# Patient Record
Sex: FEMALE | Race: WHITE
[De-identification: names, ages, dates, MRNs, and addresses within clinical notes are randomized per-mention and may not be internally consistent; named-entity substitution may affect disease eponyms.]

---

## 2017-07-26 ENCOUNTER — HOSPITAL ENCOUNTER (OUTPATIENT)
Dept: HOSPITAL 45 - C.RAD1850 | Age: 32
Discharge: HOME | End: 2017-07-26
Payer: COMMERCIAL

## 2017-07-26 DIAGNOSIS — E11.9: Primary | ICD-10-CM

## 2017-07-26 DIAGNOSIS — F41.9: ICD-10-CM

## 2017-07-26 DIAGNOSIS — E03.9: ICD-10-CM

## 2017-07-26 DIAGNOSIS — F32.9: ICD-10-CM

## 2017-07-26 DIAGNOSIS — G47.00: ICD-10-CM

## 2017-07-26 LAB
ALBUMIN/GLOB SERPL: 1.2 {RATIO} (ref 0.9–2)
ALP SERPL-CCNC: 71 U/L (ref 45–117)
ALT SERPL-CCNC: 21 U/L (ref 12–78)
ANION GAP SERPL CALC-SCNC: 14 MMOL/L (ref 3–11)
AST SERPL-CCNC: 8 U/L (ref 15–37)
B-OH-BUTYR SERPL-SCNC: 66.7 MG/DL (ref 0.2–2.81)
BASOPHILS # BLD: 0.03 K/UL (ref 0–0.2)
BASOPHILS NFR BLD: 0.6 %
BUN SERPL-MCNC: 20 MG/DL (ref 7–18)
BUN/CREAT SERPL: 21.5 (ref 10–20)
CALCIUM SERPL-MCNC: 9.5 MG/DL (ref 8.5–10.1)
CHLORIDE SERPL-SCNC: 104 MMOL/L (ref 98–107)
CHOLEST/HDLC SERPL: 2.4 {RATIO}
CO2 SERPL-SCNC: 16 MMOL/L (ref 21–32)
COMPLETE: YES
CREAT SERPL-MCNC: 0.92 MG/DL (ref 0.6–1.2)
EOSINOPHIL NFR BLD AUTO: 261 K/UL (ref 130–400)
EST. AVERAGE GLUCOSE BLD GHB EST-MCNC: 275 MG/DL
GLOBULIN SER-MCNC: 3.7 GM/DL (ref 2.5–4)
GLUCOSE SERPL-MCNC: 336 MG/DL (ref 70–99)
GLUCOSE UR QL: 79 MG/DL
HCT VFR BLD CALC: 49.7 % (ref 37–47)
IG%: 0.4 %
IMM GRANULOCYTES NFR BLD AUTO: 34.5 %
KETONES UR QL STRIP: 95 MG/DL
LYMPHOCYTES # BLD: 1.71 K/UL (ref 1.2–3.4)
MCH RBC QN AUTO: 33.9 PG (ref 25–34)
MCHC RBC AUTO-ENTMCNC: 35 G/DL (ref 32–36)
MCV RBC AUTO: 96.9 FL (ref 80–100)
MONOCYTES NFR BLD: 6.9 %
NEUTROPHILS # BLD AUTO: 2.4 %
NEUTROPHILS NFR BLD AUTO: 55.2 %
NITRITE UR QL STRIP: 93 MG/DL (ref 0–150)
PH UR: 193 MG/DL (ref 0–200)
PMV BLD AUTO: 12.5 FL (ref 7.4–10.4)
POTASSIUM SERPL-SCNC: 5.1 MMOL/L (ref 3.5–5.1)
RBC # BLD AUTO: 5.13 M/UL (ref 4.2–5.4)
SODIUM SERPL-SCNC: 134 MMOL/L (ref 136–145)
TSH SERPL-ACNC: 0.8 UIU/ML (ref 0.3–4.5)
VERY LOW DENSITY LIPOPROT CALC: 19 MG/DL
WBC # BLD AUTO: 4.96 K/UL (ref 4.8–10.8)

## 2017-07-26 NOTE — DIAGNOSTIC IMAGING REPORT
CHEST 2 VIEWS ROUTINE



CLINICAL HISTORY: COARSE/CRACKLES L BASE dyspnea



COMPARISON STUDY:  10/30/2013



FINDINGS: The bones soft tissues and hemidiaphragms are normal. The

cardiomediastinal silhouette is normal. The lungs are clear. The pulmonary

vasculature is normal. 



IMPRESSION:  Negative chest. 











The above report was generated using voice recognition software.  It may contain

grammatical, syntax or spelling errors.









Electronically signed by:  Kaz Cummins M.D.

7/26/2017 12:13 PM



Dictated Date/Time:  7/26/2017 12:13 PM

## 2017-07-31 ENCOUNTER — HOSPITAL ENCOUNTER (OUTPATIENT)
Dept: HOSPITAL 45 - C.LAB1850 | Age: 32
Discharge: HOME | End: 2017-07-31
Payer: COMMERCIAL

## 2017-07-31 DIAGNOSIS — E10.10: ICD-10-CM

## 2017-07-31 DIAGNOSIS — E10.65: Primary | ICD-10-CM

## 2017-07-31 LAB
ALBUMIN/GLOB SERPL: 1.2 {RATIO} (ref 0.9–2)
ALP SERPL-CCNC: 56 U/L (ref 45–117)
ALT SERPL-CCNC: 19 U/L (ref 12–78)
ANION GAP SERPL CALC-SCNC: 5 MMOL/L (ref 3–11)
APPEARANCE UR: CLEAR
AST SERPL-CCNC: 11 U/L (ref 15–37)
B-OH-BUTYR SERPL-SCNC: 1.33 MG/DL (ref 0.2–2.81)
BILIRUB UR-MCNC: (no result) MG/DL
BUN SERPL-MCNC: 13 MG/DL (ref 7–18)
BUN/CREAT SERPL: 20.2 (ref 10–20)
CALCIUM SERPL-MCNC: 8.6 MG/DL (ref 8.5–10.1)
CHLORIDE SERPL-SCNC: 109 MMOL/L (ref 98–107)
CO2 SERPL-SCNC: 26 MMOL/L (ref 21–32)
COLOR UR: YELLOW
CREAT SERPL-MCNC: 0.63 MG/DL (ref 0.6–1.2)
GLOBULIN SER-MCNC: 2.9 GM/DL (ref 2.5–4)
GLUCOSE SERPL-MCNC: 175 MG/DL (ref 70–99)
MANUAL MICROSCOPIC REQUIRED?: NO
NITRITE UR QL STRIP: (no result)
PH UR STRIP: 6 [PH] (ref 4.5–7.5)
POTASSIUM SERPL-SCNC: 4.1 MMOL/L (ref 3.5–5.1)
REVIEW REQ?: NO
SODIUM SERPL-SCNC: 140 MMOL/L (ref 136–145)
SP GR UR STRIP: 1.01 (ref 1–1.03)
URINE BILL WITH OR WITHOUT MIC: (no result)
UROBILINOGEN UR-MCNC: (no result) MG/DL

## 2017-08-02 NOTE — CODING QUERY NO DIAGNOSIS
TREATMENT RENDERED WITHOUT A DIAGNOSIS                                                  



To promote full compliance with coding requirements relating to patient care, physician 
participation is requested in all cases of  uncertainty.  Please assist us with 
providing a diagnosis/symptom for the test(s) below:



A diagnosis/symptom was not documented on your Order.  A valid diagnosis/symptom is 
required to bill all insurances.



**Please remember that we are unable to code a diagnosis of rule out, probable, possible, 
questionable, or suspected.  



Tests that require a diagnosis:

DOS 7/31

* CMP      DIAGNOSIS:

* Urinalysis      DIAGNOSIS:

* Beta-OH-Butyr      DIAGNOSIS:





Provider Signature: _____________________________ Date: _________



Thank you  

Raquel Cesar

Health Information Management

Phone:  [phone]

Fax:  [phone]



Once completed, please kindly fax back to [phone]



For questions please call [phone]

## 2017-09-29 ENCOUNTER — HOSPITAL ENCOUNTER (OUTPATIENT)
Dept: HOSPITAL 45 - C.LAB1850 | Age: 32
Discharge: HOME | End: 2017-09-29
Attending: NURSE PRACTITIONER
Payer: COMMERCIAL

## 2017-09-29 DIAGNOSIS — E03.9: ICD-10-CM

## 2017-09-29 DIAGNOSIS — E11.9: Primary | ICD-10-CM

## 2017-09-29 LAB
ALBUMIN/GLOB SERPL: 1.2 {RATIO} (ref 0.9–2)
ALP SERPL-CCNC: 56 U/L (ref 45–117)
ALT SERPL-CCNC: 16 U/L (ref 12–78)
ANION GAP SERPL CALC-SCNC: 10 MMOL/L (ref 3–11)
AST SERPL-CCNC: 13 U/L (ref 15–37)
BUN SERPL-MCNC: 12 MG/DL (ref 7–18)
BUN/CREAT SERPL: 16.8 (ref 10–20)
CALCIUM SERPL-MCNC: 9.1 MG/DL (ref 8.5–10.1)
CHLORIDE SERPL-SCNC: 108 MMOL/L (ref 98–107)
CHOLEST/HDLC SERPL: 2.4 {RATIO}
CO2 SERPL-SCNC: 23 MMOL/L (ref 21–32)
CREAT SERPL-MCNC: 0.71 MG/DL (ref 0.6–1.2)
GLOBULIN SER-MCNC: 3.3 GM/DL (ref 2.5–4)
GLUCOSE SERPL-MCNC: 111 MG/DL (ref 70–99)
GLUCOSE UR QL: 73 MG/DL
KETONES UR QL STRIP: 83 MG/DL
NITRITE UR QL STRIP: 93 MG/DL (ref 0–150)
PH UR: 175 MG/DL (ref 0–200)
POTASSIUM SERPL-SCNC: 4 MMOL/L (ref 3.5–5.1)
SODIUM SERPL-SCNC: 141 MMOL/L (ref 136–145)
TSH SERPL-ACNC: 0.04 UIU/ML (ref 0.3–4.5)
VERY LOW DENSITY LIPOPROT CALC: 19 MG/DL

## 2017-09-30 LAB — EST. AVERAGE GLUCOSE BLD GHB EST-MCNC: 160 MG/DL

## 2018-01-31 ENCOUNTER — HOSPITAL ENCOUNTER (INPATIENT)
Dept: HOSPITAL 45 - C.EDB | Age: 33
LOS: 5 days | Discharge: HOME | DRG: 885 | End: 2018-02-05
Attending: PSYCHIATRY & NEUROLOGY | Admitting: PSYCHIATRY & NEUROLOGY
Payer: COMMERCIAL

## 2018-01-31 VITALS
BODY MASS INDEX: 17.8 KG/M2 | BODY MASS INDEX: 17.8 KG/M2 | HEIGHT: 64.02 IN | WEIGHT: 104.28 LBS | HEIGHT: 64.02 IN | WEIGHT: 104.28 LBS | BODY MASS INDEX: 17.8 KG/M2

## 2018-01-31 VITALS — OXYGEN SATURATION: 99 %

## 2018-01-31 VITALS — SYSTOLIC BLOOD PRESSURE: 110 MMHG | HEART RATE: 65 BPM | DIASTOLIC BLOOD PRESSURE: 75 MMHG | TEMPERATURE: 97.52 F

## 2018-01-31 DIAGNOSIS — F41.9: ICD-10-CM

## 2018-01-31 DIAGNOSIS — Z87.891: ICD-10-CM

## 2018-01-31 DIAGNOSIS — Z79.899: ICD-10-CM

## 2018-01-31 DIAGNOSIS — Z79.4: ICD-10-CM

## 2018-01-31 DIAGNOSIS — F33.2: Primary | ICD-10-CM

## 2018-01-31 DIAGNOSIS — R45.851: ICD-10-CM

## 2018-01-31 DIAGNOSIS — E10.9: ICD-10-CM

## 2018-01-31 DIAGNOSIS — E03.9: ICD-10-CM

## 2018-01-31 LAB
ALBUMIN SERPL-MCNC: 3.8 GM/DL (ref 3.4–5)
ALP SERPL-CCNC: 91 U/L (ref 45–117)
ALT SERPL-CCNC: 24 U/L (ref 12–78)
AST SERPL-CCNC: 11 U/L (ref 15–37)
BASOPHILS # BLD: 0.01 K/UL (ref 0–0.2)
BASOPHILS NFR BLD: 0.2 %
BUN SERPL-MCNC: 13 MG/DL (ref 7–18)
CALCIUM SERPL-MCNC: 8.7 MG/DL (ref 8.5–10.1)
CO2 SERPL-SCNC: 30 MMOL/L (ref 21–32)
CREAT SERPL-MCNC: 1.03 MG/DL (ref 0.6–1.2)
EOS ABS #: 0.17 K/UL (ref 0–0.5)
EOSINOPHIL NFR BLD AUTO: 208 K/UL (ref 130–400)
GLUCOSE SERPL-MCNC: 378 MG/DL (ref 70–99)
HCT VFR BLD CALC: 46.4 % (ref 37–47)
HGB BLD-MCNC: 16.6 G/DL (ref 12–16)
IG#: 0.02 K/UL (ref 0–0.02)
IMM GRANULOCYTES NFR BLD AUTO: 33.3 %
LYMPHOCYTES # BLD: 1.55 K/UL (ref 1.2–3.4)
MCH RBC QN AUTO: 35.5 PG (ref 25–34)
MCHC RBC AUTO-ENTMCNC: 35.8 G/DL (ref 32–36)
MCV RBC AUTO: 99.1 FL (ref 80–100)
MONO ABS #: 0.4 K/UL (ref 0.11–0.59)
MONOCYTES NFR BLD: 8.6 %
NEUT ABS #: 2.5 K/UL (ref 1.4–6.5)
NEUTROPHILS # BLD AUTO: 3.7 %
NEUTROPHILS NFR BLD AUTO: 53.8 %
PMV BLD AUTO: 11.6 FL (ref 7.4–10.4)
POTASSIUM SERPL-SCNC: 3.9 MMOL/L (ref 3.5–5.1)
PROT SERPL-MCNC: 7.7 GM/DL (ref 6.4–8.2)
RED CELL DISTRIBUTION WIDTH CV: 12.7 % (ref 11.5–14.5)
RED CELL DISTRIBUTION WIDTH SD: 45.8 FL (ref 36.4–46.3)
SODIUM SERPL-SCNC: 132 MMOL/L (ref 136–145)
WBC # BLD AUTO: 4.65 K/UL (ref 4.8–10.8)

## 2018-01-31 RX ADMIN — INSULIN ASPART SCH UNITS: 100 INJECTION, SOLUTION INTRAVENOUS; SUBCUTANEOUS at 22:00

## 2018-01-31 NOTE — EMERGENCY ROOM VISIT NOTE
History


Report prepared by Elva:  Nick Dumont


Under the Supervision of:  Dr. Iggy Benjamin M.D.


First contact with patient:  13:50


Chief Complaint:  MENTAL HEALTH EVALUATION


Stated Complaint:  SUICIADAL IDEATION, PSYCHOMOTOR,NETARDATION, DEP





History of Present Illness


The patient is a 32 year old female who presents to the Emergency Room with 

complaints of suicidal ideation. She denies having a plan and states it is 

appealing but denies follow through due to her family. She states she was fired 

and that her grandma  recently which has triggered her worsening depression 

for the past 2 months. She is currently taking Zoloft that was prescribed by 

her endocrinologist. She states she overdosed on insulin 5 years ago in an 

attempt to end her life. She denies fevers, chills, cough, congestion, and 

nausea, and vomiting. She states limited dietary intake. Of note, she does not 

currently follow up with a psychiatrist.





   Source of History:  patient


   Onset:  prior to arrival


   Position:  other (suicidal ideation)


   Timing:  worsening (worsening depression for the past 2 months)


   Associated Symptoms:  No fevers, No chills, No cough


Note:


Patient complains of limited dietary intake. Patient denies congestion.





Review of Systems


See HPI for pertinent positives and negatives.  A total of ten systems were 

reviewed and were otherwise negative.





Past Medical & Surgical


Medical Problems:


(1) Anxiety disorder NOS


(2) DIAB JOHN WO COMPL, TYPE I [JUVENILE TYPE], NOT UNCNTRLD


(3) DIAB W KETOACIDOSIS, TYPE I [JUVENILE TYPE], UNCONTROLLED


(4) Hypothyroidism


(5) Major depressive disorder


(6) Substance abuse


(7) URIN TRACT INFECTION NOS








Family History





FH: cancer


Hypertension


Kidney disease or stones





Social History


Smoking Status:  Former Smoker (She states she quit 2 weeks ago)


Alcohol Use:  none


Drug Use:  none (marijuana)


Marital Status:  in relationship


Housing Status:  lives with significant other


Occupation Status:  unemployed





Current/Historical Medications


Scheduled


Insulin Human Regular (Novolin R), 10 UNITS SC BID


Levothyroxine Sodium (Levothyroxine Sodium), 100 MCG PO 6XWK


Sertraline (Zoloft), 100 MG PO DAILY





Allergies


Coded Allergies:  


     Sulfamethoxazole w/Trimethoprim (Unverified  Allergy, Unknown, swelling, )


     Insulin Lispro (Verified  Adverse Reaction, Intermediate, MUSCLE WASTING, )


     Phenol (Unverified  Adverse Reaction, Unknown, Muscle Wasting, 16)





Physical Exam


Vital Signs











  Date Time  Temp Pulse Resp B/P (MAP) Pulse Ox O2 Delivery O2 Flow Rate FiO2


 


18 13:37 36.4 95 16 141/93 99 Room Air  











Physical Exam


GENERAL: Awake, alert, well-appearing, in no distress. Depressed affect. 

Positive suicidal ideation.


HENT: Normocephalic, atraumatic. Dry mucous membranes.


EYES: Normal conjunctiva. Sclera non-icteric.


NECK: Supple. No nuchal rigidity. FROM. No JVD.


RESPIRATORY: Clear to auscultation.


CARDIAC: Regular rate, normal rhythm. Extremities warm and well perfused. 

Pulses equal.


ABDOMEN: Soft, non-distended. No tenderness to palpation. No rebound or 

guarding. No masses.


RECTAL: Deferred.


MUSCULOSKELETAL: Chest examination reveals no tenderness. The back is 

symmetrical on inspection without obvious abnormality. There is no CVA 

tenderness to palpation. No joint edema. 


LOWER EXTREMITIES: Calves are equal size bilaterally and non-tender. No edema. 

No discoloration. 


NEURO: Normal sensorium. No sensory or motor deficits noted. 


SKIN: No rash or jaundice noted.





Medical Decision & Procedures


Laboratory Results


18 14:23








Red Blood Count 4.68, Mean Corpuscular Volume 99.1, Mean Corpuscular Hemoglobin 

35.5, Mean Corpuscular Hemoglobin Concent 35.8, Mean Platelet Volume 11.6, 

Neutrophils (%) (Auto) 53.8, Lymphocytes (%) (Auto) 33.3, Monocytes (%) (Auto) 

8.6, Eosinophils (%) (Auto) 3.7, Basophils (%) (Auto) 0.2, Neutrophils # (Auto) 

2.50, Lymphocytes # (Auto) 1.55, Monocytes # (Auto) 0.40, Eosinophils # (Auto) 

0.17, Basophils # (Auto) 0.01





18 14:23

















Test


  18


14:23 18


14:35


 


White Blood Count


  4.65 K/uL


(4.8-10.8) 


 


 


Red Blood Count


  4.68 M/uL


(4.2-5.4) 


 


 


Hemoglobin


  16.6 g/dL


(12.0-16.0) 


 


 


Hematocrit 46.4 % (37-47)  


 


Mean Corpuscular Volume


  99.1 fL


() 


 


 


Mean Corpuscular Hemoglobin


  35.5 pg


(25-34) 


 


 


Mean Corpuscular Hemoglobin


Concent 35.8 g/dl


(32-36) 


 


 


Platelet Count


  208 K/uL


(130-400) 


 


 


Mean Platelet Volume


  11.6 fL


(7.4-10.4) 


 


 


Neutrophils (%) (Auto) 53.8 %  


 


Lymphocytes (%) (Auto) 33.3 %  


 


Monocytes (%) (Auto) 8.6 %  


 


Eosinophils (%) (Auto) 3.7 %  


 


Basophils (%) (Auto) 0.2 %  


 


Neutrophils # (Auto)


  2.50 K/uL


(1.4-6.5) 


 


 


Lymphocytes # (Auto)


  1.55 K/uL


(1.2-3.4) 


 


 


Monocytes # (Auto)


  0.40 K/uL


(0.11-0.59) 


 


 


Eosinophils # (Auto)


  0.17 K/uL


(0-0.5) 


 


 


Basophils # (Auto)


  0.01 K/uL


(0-0.2) 


 


 


RDW Standard Deviation


  45.8 fL


(36.4-46.3) 


 


 


RDW Coefficient of Variation


  12.7 %


(11.5-14.5) 


 


 


Immature Granulocyte % (Auto) 0.4 %  


 


Immature Granulocyte # (Auto)


  0.02 K/uL


(0.00-0.02) 


 


 


Anion Gap


  6.0 mmol/L


(3-11) 


 


 


Est Creatinine Clear Calc


Drug Dose 59.6 ml/min 


  


 


 


Estimated GFR (


American) 84.5 


  


 


 


Estimated GFR (Non-


American 72.9 


  


 


 


BUN/Creatinine Ratio 12.7 (10-20)  


 


Calcium Level


  8.7 mg/dl


(8.5-10.1) 


 


 


Total Bilirubin


  0.5 mg/dl


(0.2-1) 


 


 


Direct Bilirubin  mg/dl (0-0.2)  


 


Aspartate Amino Transf


(AST/SGOT) 11 U/L (15-37) 


  


 


 


Alanine Aminotransferase


(ALT/SGPT) 24 U/L (12-78) 


  


 


 


Alkaline Phosphatase


  91 U/L


() 


 


 


Total Protein


  7.7 gm/dl


(6.4-8.2) 


 


 


Albumin


  3.8 gm/dl


(3.4-5.0) 


 


 


Globulin


  3.9 gm/dl


(2.5-4.0) 


 


 


Albumin/Globulin Ratio 1.0 (0.9-2)  


 


Thyroid Stimulating Hormone


(TSH) 4.360 uIu/ml


(0.300-4.500) 


 


 


Chemistry Specimen Hemolysis   


 


Ethyl Alcohol mg/dL


  < 3.0 mg/dl


(0-3) 


 


 


Urine Color  YELLOW 


 


Urine Appearance  CLEAR (CLEAR) 


 


Urine pH  5.0 (4.5-7.5) 


 


Urine Specific Gravity


  


  > 1.045


(1.000-1.030)


 


Urine Protein  NEG (NEG) 


 


Urine Glucose (UA)  3+ (NEG) 


 


Urine Ketones  NEG (NEG) 


 


Urine Occult Blood  NEG (NEG) 


 


Urine Nitrite  NEG (NEG) 


 


Urine Bilirubin  NEG (NEG) 


 


Urine Urobilinogen  NEG (NEG) 


 


Urine Leukocyte Esterase  NEG (NEG) 


 


Urine Pregnancy Test  NEG (NEG) 


 


Urine Opiates Screen  NEG (NEG) 


 


Urine Methadone, Qualitative  NEG (NEG) 


 


Urine Barbiturates  NEG (NEG) 


 


Urine Phencyclidine (PCP)


Level 


  NEG (NEG) 


 


 


Ur


Amphetamine/Methamphetamine 


  NEG (NEG) 


 


 


MDMA (Ecstasy) Screen  NEG (NEG) 


 


Urine Benzodiazepines Screen  NEG (NEG) 


 


Urine Cocaine Metabolite  NEG (NEG) 


 


Urine Marijuana (THC)  NEG (NEG) 





Laboratory results reviewed by me





Medications Administered











 Medications


  (Trade)  Dose


 Ordered  Sig/Denisse


 Route  Start Time


 Stop Time Status Last Admin


Dose Admin


 


 Sodium Chloride  1,000 ml @ 


 999 mls/hr  Q1H1M STAT


 IV  18 14:20


 18 15:20 DC 18 14:44


999 MLS/HR











ED Course


1350: The patient was evaluated in room A7. A complete history and physical 

exam was performed.





1550: Upon reexamination, the patient was doing well. I discussed the test 

results and treatment plan with the . The patient will be evaluated 

for further management.





Medical Decision


I reviewed the patient's past medical history, medications, and the nursing 

notes as described above.





The patient's presentation and history were concerning for etiologies such as 

mood disorder, infection, hypoglycemia, electrolyte abnormalities, cardiac 

sources, intracerebral event, toxicologic, neurologic, as well as others were 

entertained.





The patient is a 32-year-old woman with a past medical history of type 1 

diabetes, depression and prior suicide attempt who presents to emergency 

department with worsening depression and suicidal ideation over the past couple 

months in the setting of losing her job, breaking up with her partner, and the 

death of her grandmother per hpi.  On arrival the patient has a depressed 

affect but is in no acute distress, afebrile with stable vital signs. Endorses 

SI but denies plan. Interested in getting help and inpatient psych admission.  

FSBG elevated on arrival however the patient's asymptomatic.  Subsequent BMP 

with no anionic gap thus no DKA.  Thus, the patient was medically cleared.  She 

was given IV fluid hydration with subsequent decrease in her glucose from 469 

to 267 with IV fluids alone.  Sliding-scale insulin initially ordered however 

patient was accepted to [address] [place] [address] with plan for pharmacy consultation for insulin 

regimen.  201 signed.





Medication Reconcilliation


Current Medication List:  was personally reviewed by me





Blood Pressure Screening


Patient's blood pressure:  Elevated blood pressure


Blood pressure disposition:  Elevated BP felt to be situational





Impression





 Primary Impression:  


 Depression


 Additional Impression:  


 Suicidal ideation





Scribe Attestation


The scribe's documentation has been prepared under my direction and personally 

reviewed by me in its entirety. I confirm that the note above accurately 

reflects all work, treatment, procedures, and medical decision making performed 

by me.





Departure Information


Referrals


Carol Nelson M.D. (PCP)





Patient Instructions


[facility]





Problem Qualifiers

## 2018-02-01 VITALS — TEMPERATURE: 97.7 F | SYSTOLIC BLOOD PRESSURE: 116 MMHG | DIASTOLIC BLOOD PRESSURE: 82 MMHG | HEART RATE: 71 BPM

## 2018-02-01 RX ADMIN — INSULIN ASPART SCH UNITS: 100 INJECTION, SOLUTION INTRAVENOUS; SUBCUTANEOUS at 04:00

## 2018-02-01 RX ADMIN — INSULIN ASPART SCH UNITS: 100 INJECTION, SOLUTION INTRAVENOUS; SUBCUTANEOUS at 21:41

## 2018-02-01 RX ADMIN — LEVOTHYROXINE SODIUM SCH MCG: 100 TABLET ORAL at 08:29

## 2018-02-01 RX ADMIN — INSULIN GLARGINE SCH UNITS: 100 INJECTION, SOLUTION SUBCUTANEOUS at 08:55

## 2018-02-01 RX ADMIN — INSULIN ASPART SCH UNITS: 100 INJECTION, SOLUTION INTRAVENOUS; SUBCUTANEOUS at 13:14

## 2018-02-01 RX ADMIN — INSULIN ASPART SCH UNITS: 100 INJECTION, SOLUTION INTRAVENOUS; SUBCUTANEOUS at 17:46

## 2018-02-01 RX ADMIN — INSULIN ASPART SCH UNITS: 100 INJECTION, SOLUTION INTRAVENOUS; SUBCUTANEOUS at 00:22

## 2018-02-01 RX ADMIN — INSULIN ASPART SCH UNITS: 100 INJECTION, SOLUTION INTRAVENOUS; SUBCUTANEOUS at 08:58

## 2018-02-01 NOTE — PSYCHIATRIC HISTORY & PHYSICAL
History


Date of Service


Feb 1, 2018.





Identifying Data





Yoanna Velazquez is a 32-year-old female admitted on Jan 31, 2018 at 17:01 who 

currently lives in [place] with 3 roommates.  Yoanna Velazquez was admitted on 

a 201 voluntary commitment.  Patient is admitted from home.  The patient was 

brought to the ED by the self transport.  Information provided by the patient 

is considered to be reliable.





Chief Complaint


"Where to start...I've just been overwhelmed and feeling like I'm drowning in 

depression".





History of Present Illness


Yoanna Velazquez is a 32-year-old  female admitted to [address] [place] [address] on a 

voluntary commitment following worsening depression with SI for the past 2 

months.  Pt notes several recent stressors including her grandmother's death in 

September 2017 and her boyfriend of 3.5 years leaving her in November 2017.  Pt 

states she started to feel worsening of depression in mid-December which 

worsened when she was fired from her job 1.5 weeks ago due to not showing up 

for work due to decreased mood.  Pt states she has struggled with depression "

on and off for my entire adult life".  She has had several inpatient 

hospitalizations in the past for depression and SI at [facility], [facility], 

[place], and [facility].  Her most recent admission was at [place] [facility] in 

2014 following depression and a suicide attempt in which she attempted to 

overdose on her insulin.  She was been taking Zoloft 100mg regularly for the 

past 9 months, currently prescribed by her endocrinologist as her mood was 

concerning.  Pt is a Type-1 diabetic and also has been diagnosed with 

hypothyroidism.  She reports recent weight loss due to nausea and abdominal 

pain with ingestion of food.  





Pt reports depressive symptoms including low mood, isolative behavior, 

decreased functioning in regard to attending to hygiene, reduced appetite 

resulting in >20lb weight loss in the last 2 months, sleep difficulties with 

frequent awakening, increased desire to remain in bed, decreased concentration, 

hopelessness, and guilt.  Pt states she has had SI for the past few months, and 

states "suicide is appealing, but I haven't thought about an actual plan".  Pt 

states her concern for her family has prevented her from acting on these 

thoughts.  Pt also reports ongoing anxiety specifically in relation to social 

interactions.  She reports feeling uncomfortable with situations outside of her 

home or group of roommates.  She reports 2-3 panic attacks in the last months 

in which "it feels like death is imminent and I worry about what that will mean 

for me and my family".  





Pt denies current outpatient psychiatric treatment, but was seen a few years 

ago at [facility] for both medication management and therapy.  Pt 

reports trials of Celexa, Wellbutrin, Klonopin, Abilify, and Lexapro.  Pt feels 

the Lexapro has worked the best and recalls taking 10mg daily for about 1 year.

  At some point Abilify was added to the regimen which she reports was also 

beneficial to her mood.  Pt denies HI, SIB aside from unintentionally picking 

skin, joel, paranoia, OCD, PTSD, eating disorder, and other psychosis.





Past Psychiatric History


Current OP Treatment:  no current treatment


Prior OP Treatment:  psychiatrist ([facility]), therapist ([facility])


Prior Psych Hospitalizations:  [facility], other ([place], 

[facility], [facility], and [place])


Access to a Gun:  No


Suicide Attempts:  Yes (2014 - attempted insulin overdose)


Past Medication Trials


Celexa - increased SI


Lexapro - worked well; x1 year


Abilify - adjunct to Lexapro; worked well


Wellbutrin


Klonopin





Past Medical/Surgical History





(1) DIAB JOHN WO COMPL, TYPE I [JUVENILE TYPE], NOT UNCNTRLD





Allergies


Allergies:  


Coded Allergies:  


     Sulfamethoxazole w/Trimethoprim (Unverified  Allergy, Unknown, swelling, 9/ 17/16)


     Insulin Lispro (Verified  Adverse Reaction, Intermediate, MUSCLE WASTING, 9 /17/16)


     Phenol (Unverified  Adverse Reaction, Unknown, Muscle Wasting, 9/17/16)





Home Medications


Scheduled


Insulin Human Regular (Novolin R), 10 UNITS SC BID


Levothyroxine Sodium (Levothyroxine Sodium), 100 MCG PO 6XWK


Sertraline (Zoloft), 100 MG PO DAILY





Family History





FH: cancer


Hypertension


Kidney disease or stones


History of Suicide:  Yes (grandmother's brother)


History of Substance Abuse:  Yes (brother)


Psychiatric History:  Yes (mother and brother with anxiety, father with 

depression)





Alcohol Use


Alcohol Use In Past 12 Months:  No


AUDIT Total Score:  0





Smoking Use


Smoking Status:  Former Smoker





Substance History


Pt reports marijuana use 2-3 times a month





Personal History


Lives in:  [place] [place]


Childhood:


Grew up in [place] 1 boilogical brother. Her mother and 2 younger half 

sisters live locally.


Education:  graduated college (BS:  [employer])


Work History:


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


Relationship History:  never 


Children:  none


Spiritual Affiliation:  "spiritual"


Legal History:  none


Psychological Trauma History:  Significant Loss (grandmother, father's death 

from cancer)





Review of Systems


Psych:  denies symptoms other than stated above


Constitutional: denied


Cardiovascular: reports tachycardia with anxiety


Respiratory:  cough, believed due to smoking history


GI: reports nausea, indigestion, and abdominal pain with food intake


Neurologic: denied


Remainder of 10 body systems also reviewed and denied other than noted above.





Examination


Physical Examination


A physical exam was performed in the ER prior to admission to the unit by Dr. Benjamin.  I accept that physical as correct/medical clearance for the inpatient 

physical exam.





Vital Signs





Vital Signs Past 12 Hours








  Date Time  Temp Pulse Resp B/P (MAP) Pulse Ox O2 Delivery O2 Flow Rate FiO2


 


2/1/18 06:33 36.5 71 16 101/65    





  80  116/82    











Laboratory Results





Last 24 Hours








Test


  1/31/18


14:10 1/31/18


14:12 1/31/18


14:23 1/31/18


14:35


 


Bedside Glucose 469 mg/dl  485 mg/dl   


 


White Blood Count   4.65 K/uL  


 


Red Blood Count   4.68 M/uL  


 


Hemoglobin   16.6 g/dL  


 


Hematocrit   46.4 %  


 


Mean Corpuscular Volume   99.1 fL  


 


Mean Corpuscular Hemoglobin   35.5 pg  


 


Mean Corpuscular Hemoglobin


Concent 


  


  35.8 g/dl 


  


 


 


Platelet Count   208 K/uL  


 


Mean Platelet Volume   11.6 fL  


 


Neutrophils (%) (Auto)   53.8 %  


 


Lymphocytes (%) (Auto)   33.3 %  


 


Monocytes (%) (Auto)   8.6 %  


 


Eosinophils (%) (Auto)   3.7 %  


 


Basophils (%) (Auto)   0.2 %  


 


Neutrophils # (Auto)   2.50 K/uL  


 


Lymphocytes # (Auto)   1.55 K/uL  


 


Monocytes # (Auto)   0.40 K/uL  


 


Eosinophils # (Auto)   0.17 K/uL  


 


Basophils # (Auto)   0.01 K/uL  


 


RDW Standard Deviation   45.8 fL  


 


RDW Coefficient of Variation   12.7 %  


 


Immature Granulocyte % (Auto)   0.4 %  


 


Immature Granulocyte # (Auto)   0.02 K/uL  


 


Sodium Level   132 mmol/L  


 


Potassium Level   3.9 mmol/L  


 


Chloride Level   96 mmol/L  


 


Carbon Dioxide Level   30 mmol/L  


 


Anion Gap   6.0 mmol/L  


 


Blood Urea Nitrogen   13 mg/dl  


 


Creatinine   1.03 mg/dl  


 


Est Creatinine Clear Calc


Drug Dose 


  


  59.6 ml/min 


  


 


 


Estimated GFR (


American) 


  


  84.5 


  


 


 


Estimated GFR (Non-


American 


  


  72.9 


  


 


 


BUN/Creatinine Ratio   12.7  


 


Random Glucose   378 mg/dl  


 


Calcium Level   8.7 mg/dl  


 


Total Bilirubin   0.5 mg/dl  


 


Direct Bilirubin    mg/dl  


 


Aspartate Amino Transf


(AST/SGOT) 


  


  11 U/L 


  


 


 


Alanine Aminotransferase


(ALT/SGPT) 


  


  24 U/L 


  


 


 


Alkaline Phosphatase   91 U/L  


 


Total Protein   7.7 gm/dl  


 


Albumin   3.8 gm/dl  


 


Globulin   3.9 gm/dl  


 


Albumin/Globulin Ratio   1.0  


 


Thyroid Stimulating Hormone


(TSH) 


  


  4.360 uIu/ml 


  


 


 


Chemistry Specimen Hemolysis     


 


Ethyl Alcohol mg/dL   < 3.0 mg/dl  


 


Urine Color    YELLOW 


 


Urine Appearance    CLEAR 


 


Urine pH    5.0 


 


Urine Specific Gravity    > 1.045 


 


Urine Protein    NEG 


 


Urine Glucose (UA)    3+ 


 


Urine Ketones    NEG 


 


Urine Occult Blood    NEG 


 


Urine Nitrite    NEG 


 


Urine Bilirubin    NEG 


 


Urine Urobilinogen    NEG 


 


Urine Leukocyte Esterase    NEG 


 


Urine Pregnancy Test    NEG 


 


Urine Opiates Screen    NEG 


 


Urine Methadone, Qualitative    NEG 


 


Urine Barbiturates    NEG 


 


Urine Phencyclidine (PCP)


Level 


  


  


  NEG 


 


 


Ur


Amphetamine/Methamphetamine 


  


  


  NEG 


 


 


MDMA (Ecstasy) Screen    NEG 


 


Urine Benzodiazepines Screen    NEG 


 


Urine Cocaine Metabolite    NEG 


 


Urine Marijuana (THC)    NEG 


 


Test


  1/31/18


15:57 1/31/18


18:17 1/31/18


20:26 1/31/18


20:51


 


Bedside Glucose 267 mg/dl  224 mg/dl  50 mg/dl  86 mg/dl 


 


Test


  1/31/18


22:08 2/1/18


00:07 2/1/18


04:21 2/1/18


07:43


 


Bedside Glucose 201 mg/dl  165 mg/dl  153 mg/dl  193 mg/dl 











Mental Examination


During interview pt is:  alert and oriented, cooperative


Appearance:  disheveled (unkempt hair, t-shirt inside out)


Eye contact is:  fair


Motor behavior is:  steady gait & station, no abnormal motor movements


Speech:  other (Normal in rate and rhythm, soft, hesitant)


Affect:  mood congruent, depressed, tearful, anxious


Mood is:  depressed, anxious


Thought process:  goal directed, clear, coherent


Thought content:  reality based without delusions


Suicidal thought are:  present ("suicide sounds appealing"), Plan: denied, 

Intent: denied


Homicidal thoughts are:  denied


Hallucinations:  denies auditory, denies visual


Cognition:  memory grossly intact, attention grossly intact, language grossly 

intact


Intelligence estimated to be:  average


Insight:  limited


Judgement:  limited





Impression / Recommendations


Impression


32-year-old  female presenting with worsening depression and SI.  Pt 

notes depression has worsened with recent stressor including the end of a long-

term relationship, her grandmother's death, and her recent job loss.  Pt denies 

current plan or intent and states she typically seeks help before it reaches 

that level of severity.  Pt has a history of suicide attempt by overdose on 

insulin in the past.  Currently tolerating Zoloft 100mg daily for the past 9 

months.  Also has done well with Lexapro 10mg for 1 year, with and without 

Abilify adjunct.





Inventory Assets


Strengths:


willingness for treatment, family supports


Needs:


mitigate risk factors, assistance dealing with recent stressors.





Risk Factors Assessment


:  Yes


/single/:  Yes


Higher / Fall in social status:  No


Access to guns:  No


Health problems:  Yes


Mental Health Diagnoses:  Yes


Previous attempt:  Yes


Family history of suicide:  No


Previous psychiatric stay:  Yes


Hopelessness:  Yes


Smoker:  No (recently quit)





Protective Factors Assessment


Muslim beliefs:  Yes


:  No


Responsible for young children:  No


Employed:  No


Stable relationships:  No


Supportive family:  Yes


Good rapport with provider:  No





Recommendations





(1) Major depressive disorder, recurrent episode


2/1


   - Increase sertraline to 150mg daily.


   - Encourage participation in group programming including recreation therapy 

and group therapy.


   - Coordination with previous outpatient providers.


   - Involvement of identified supports in family meeting.





(2) Suicidal ideation


2/1


   - Suicide safety checks q15 minutes


   - Encourage engagement in group programming with attention to appropriate 

coping skills.





(3) Anxiety disorder NOS


2/1


   - Reports panic attacks 2-3 times a month along with anxiety with social 

interactions.


   - See depression treatment above.


   - Hydroxyzine prn.





(4) Type 1 diabetes mellitus


2/1


   - Diabetic pharmacy consult and management due to diagnosis and inconsistent 

random blood glucose readings on the unit.





(5) Hypothyroidism


2/1


   - TSH checked prior to admission in ED.  Level as of 1/31 = 4.360.


   - Pt reports regular follow up with endocrinology








CPT Code


Initial Hospital Care:  79783





Problem Qualifiers





(1) Major depressive disorder, recurrent episode:  


Major depression episode severity:  severe  Psychotic features:  without 

psychotic features  Qualified Codes:  F33.2 - Major depressive disorder, 

recurrent severe without psychotic features

## 2018-02-01 NOTE — PHARMACY PROGRESS NOTE
Glycemic Control Intl Consult


Date of Service


Feb 1, 2018.





Scope


Glycemic Pharmacist consulted by Dr Francis on 1/31/18 for glycemic control and to 

write orders per [facility] inpatient glycemic control protocol





Objective


Weight (Kilograms):  47.300


Accuchecks BSG (last 24hrs):











Test


  1/31/18


14:10 1/31/18


14:12 1/31/18


14:23 1/31/18


15:57


 


Bedside Glucose


  469 mg/dl


(70-90) 485 mg/dl


(70-90) 


  267 mg/dl


(70-90)


 


Random Glucose


  


  


  378 mg/dl


(70-99) 


 


 


Test


  1/31/18


18:17 1/31/18


20:26 1/31/18


20:51 1/31/18


22:08


 


Bedside Glucose


  224 mg/dl


(70-90) 50 mg/dl


(70-90) 86 mg/dl


(70-90) 201 mg/dl


(70-90)


 


Test


  2/1/18


00:07 2/1/18


04:21 2/1/18


07:43 


 


 


Bedside Glucose


  165 mg/dl


(70-90) 153 mg/dl


(70-90) 193 mg/dl


(70-90) 


 








Laboratory Data (last 24hrs)











Test


  1/31/18


14:23


 


Anion Gap 6.0 mmol/L 


 


BUN/Creatinine Ratio 12.7 


 


Blood Urea Nitrogen 13 mg/dl 


 


Creatinine 1.03 mg/dl 


 


Potassium Level 3.9 mmol/L 


 


Sodium Level 132 mmol/L 


 


White Blood Count 4.65 K/uL 


 


Red Blood Count 4.68 M/uL 


 


Hemoglobin 16.6 g/dL 


 


Hematocrit 46.4 % 


 


Mean Corpuscular Volume 99.1 fL 


 


Mean Corpuscular Hemoglobin 35.5 pg 


 


Mean Corpuscular Hemoglobin


Concent 35.8 g/dl 


 


 


Platelet Count 208 K/uL 


 


Mean Platelet Volume 11.6 fL 


 


Neutrophils (%) (Auto) 53.8 % 


 


Lymphocytes (%) (Auto) 33.3 % 


 


Monocytes (%) (Auto) 8.6 % 


 


Eosinophils (%) (Auto) 3.7 % 


 


Basophils (%) (Auto) 0.2 % 


 


Neutrophils # (Auto) 2.50 K/uL 


 


Lymphocytes # (Auto) 1.55 K/uL 


 


Monocytes # (Auto) 0.40 K/uL 


 


Eosinophils # (Auto) 0.17 K/uL 


 


Basophils # (Auto) 0.01 K/uL 











Recent Pertinent Medications


Outpatient Anti-diabetic Regimen: 


* Novolin R 10 units BID (or Lantus 15 units SQ AM when pt can afford it) with 

Sliding scale Regular insulin


* A1c = 7.2 %  9/29/17








The patient is currently receiving:


* Basal insulin:              Lantus 8 units x 1 dose last night





* Correctional Insulin:     Novolog Correction per scale ACHS


                            Goal Range: Low 120 mg/dL - High 160 mg/dL


                            Correction Factor: 50 mg/dL/unit





* Prandial insulin:           Per carb ratio of 1 unit per 17 grams CHO consumed








Risk Factors for Insulin Resistance:


* Diet: Type 2 DM





Assessment & Plan


ASSESSMENT:


* 32 year old female type 1 diabetic, well controlled per A1c in September. 

Needs updated A1c, will order with AM labs


* Admitted to [facility] for suicidal ideation


* Admitted with hyperglycemia, then had an episode of hypoglycemia last night, 

treated with OJ and snack


* Fasting AM BSG 193mg/dl this morning, will begin Lantus based on BSG once 

daily in the morning and continue CF and CR and titrate to goal





PLAN FOR INPATIENT GLYCEMIC CONTROL:





* Basal insulin with LANTUS SQ daily


 * 0 units for BSG < 80mg/dl


 * 10 units for BSG 80-180mg/dl


 * 14 units for BSG > 180mg/dl





* Correctional Insulin with NOVOLOG per scale ACHS or Q6hr while NPO


 * Goal Range:  Low 120 mg/dL - High 160 mg/dL


 * Correction Factor: 50 mg/dL/unit


 * Nutritional / Prandial insulin per carb ratio of 1 unit per 17 grams CHO 

consumed





* Please note that the plan above was derived based on current level of insulin 

resistance and hospital stress. These recommendations are appropriate for 

inpatient admission only. Plan of care upon discharge will need to be 

reassessed to avoid potential outpatient hypo/hyperglycemia. 





Thank you.

## 2018-02-01 NOTE — PSYCHIATRIC HISTORY & PHYSICAL
History


Date of Service


Feb 1, 2018.





Identifying Data





Yaonna Velazquez is a 32-year-old female admitted on Jan 31, 2018 at 17:01 who 

currently lives in [place] with 3 roommates.  Yoanna Velazquez was admitted on 

a 201 voluntary commitment.  Patient is admitted from home.  The patient was 

brought to the ED by the self transport.  Information provided by the patient 

is considered to be reliable.





Chief Complaint


"Where to start...I've just been overwhelmed and feeling like I'm drowning in 

depression".





History of Present Illness


Yoanna Velazquez is a 32-year-old  female admitted to [address] [place] [address] on a 

voluntary commitment following worsening depression with SI for the past 2 

months.  Pt notes several recent stressors including her grandmother's death in 

September 2017 and her boyfriend of 3.5 years leaving her in November 2017.  Pt 

states she started to feel worsening of depression in mid-December which 

worsened when she was fired from her job 1.5 weeks ago due to not showing up 

for work due to decreased mood.  Pt states she has struggled with depression "

on and off for my entire adult life".  She has had several inpatient 

hospitalizations in the past for depression and SI at [facility], [facility], 

[facility], and [facility].  Her most recent admission was at [facility] in 

2014 following depression and a suicide attempt in which she attempted to 

overdose on her insulin.  She was been taking Zoloft 100mg regularly for the 

past 9 months, currently prescribed by her endocrinologist as her mood was 

concerning.  Pt is a Type-1 diabetic and also has been diagnosed with 

hypothyroidism.  She reports recent weight loss due to nausea and abdominal 

pain with ingestion of food.  





Pt reports depressive symptoms including low mood, isolative behavior, 

decreased functioning in regard to attending to hygiene, reduced appetite 

resulting in >20lb weight loss in the last 2 months, sleep difficulties with 

frequent awakening, increased desire to remain in bed, decreased concentration, 

hopelessness, and guilt.  Pt states she has had SI for the past few months, and 

states "suicide is appealing, but I haven't thought about an actual plan".  Pt 

states her concern for her family has prevented her from acting on these 

thoughts.  Pt also reports ongoing anxiety specifically in relation to social 

interactions.  She reports feeling uncomfortable with situations outside of her 

home or group of roommates.  She reports 2-3 panic attacks in the last months 

in which "it feels like death is imminent and I worry about what that will mean 

for me and my family".  





Pt denies current outpatient psychiatric treatment, but was seen a few years 

ago at [facility] for both medication management and therapy.  Pt 

reports trials of Celexa, Wellbutrin, Klonopin, Abilify, and Lexapro.  Pt feels 

the Lexapro has worked the best and recalls taking 10mg daily for about 1 year.

  At some point Abilify was added to the regimen which she reports was also 

beneficial to her mood.  Pt denies HI, SIB aside from unintentionally picking 

skin, joel, paranoia, OCD, PTSD, eating disorder, and other psychosis.





Past Psychiatric History


Current OP Treatment:  no current treatment


Prior OP Treatment:  psychiatrist ([facility]), therapist ([facility])


Prior Psych Hospitalizations:  [facility], other ([facility], 

[facility], [facility], and [facility])


Access to a Gun:  No


Suicide Attempts:  Yes (2014 - attempted insulin overdose)


Past Medication Trials


Celexa - increased SI


Lexapro - worked well; x1 year


Abilify - adjunct to Lexapro; worked well


Wellbutrin


Klonopin





Past Medical/Surgical History


History of Concussion/Seizure:  Yes (diabetic seizures with hypoglycemia; last 

seizure reported was several years ago)





(1) Type 1 diabetes mellitus





Allergies


Allergies:  


Coded Allergies:  


     Sulfamethoxazole w/Trimethoprim (Unverified  Allergy, Unknown, swelling, 9/ 17/16)


     Insulin Lispro (Verified  Adverse Reaction, Intermediate, MUSCLE WASTING, 9 /17/16)


     Phenol (Unverified  Adverse Reaction, Unknown, Muscle Wasting, 9/17/16)





Home Medications


Scheduled


Insulin Human Regular (Novolin R), 10 UNITS SC BID


Levothyroxine Sodium (Levothyroxine Sodium), 100 MCG PO 6XWK


Sertraline (Zoloft), 100 MG PO DAILY





Family History





FH: cancer


Hypertension


Kidney disease or stones


History of Suicide:  Yes (Grandmother's brother)


History of Substance Abuse:  Yes (brother)


Psychiatric History:  Yes (Mother and Brother - anxiety and depression; Father 

- depression)





Alcohol Use


Alcohol Use In Past 12 Months:  No


AUDIT Total Score:  0





Smoking Use


Smoking Status:  Former Smoker


quit 2 weeks ago; previous 1ppd x 10 years





Substance History


Pt reports marijuana use 2-3 times a month





Personal History


Lives in:  [place], [place]


Childhood:


Grew up in [place].  She reports 1 biological brother.  Her mother and 2 

young half-sisters remain in the area.


Education:  graduated college (BS:  [employer])


Work History:


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


Relationship History:  never 


Children:  none


Spiritual Affiliation:  "spiritual"


Legal History:  none


Psychological Trauma History:  Significant Loss (grandmother, father's death 

from cancer)





Review of Systems


Psych:  denies symptoms other than stated above


Constitutional: denied


Cardiovascular: reports tachycardia with anxiety


Respiratory:  cough, believed due to smoking history


GI: reports nausea, indigestion, and abdominal pain with food intake


Neurologic: denied


Remainder of 10 body systems also reviewed and denied other than noted above.





Examination


Physical Examination


A physical exam was performed in the ER prior to admission to the unit by 

Iggy Benjamin M.D.  I accept that physical as correct/medical clearance 

for the inpatient physical exam.





Vital Signs





Vital Signs Past 12 Hours








  Date Time  Temp Pulse Resp B/P (MAP) Pulse Ox O2 Delivery O2 Flow Rate FiO2


 


2/1/18 06:33 36.5 71 16 101/65    





  80  116/82    











Laboratory Results





Last 24 Hours








Test


  1/31/18


14:10 1/31/18


14:12 1/31/18


14:23 1/31/18


14:35


 


Bedside Glucose 469 mg/dl  485 mg/dl   


 


White Blood Count   4.65 K/uL  


 


Red Blood Count   4.68 M/uL  


 


Hemoglobin   16.6 g/dL  


 


Hematocrit   46.4 %  


 


Mean Corpuscular Volume   99.1 fL  


 


Mean Corpuscular Hemoglobin   35.5 pg  


 


Mean Corpuscular Hemoglobin


Concent 


  


  35.8 g/dl 


  


 


 


Platelet Count   208 K/uL  


 


Mean Platelet Volume   11.6 fL  


 


Neutrophils (%) (Auto)   53.8 %  


 


Lymphocytes (%) (Auto)   33.3 %  


 


Monocytes (%) (Auto)   8.6 %  


 


Eosinophils (%) (Auto)   3.7 %  


 


Basophils (%) (Auto)   0.2 %  


 


Neutrophils # (Auto)   2.50 K/uL  


 


Lymphocytes # (Auto)   1.55 K/uL  


 


Monocytes # (Auto)   0.40 K/uL  


 


Eosinophils # (Auto)   0.17 K/uL  


 


Basophils # (Auto)   0.01 K/uL  


 


RDW Standard Deviation   45.8 fL  


 


RDW Coefficient of Variation   12.7 %  


 


Immature Granulocyte % (Auto)   0.4 %  


 


Immature Granulocyte # (Auto)   0.02 K/uL  


 


Sodium Level   132 mmol/L  


 


Potassium Level   3.9 mmol/L  


 


Chloride Level   96 mmol/L  


 


Carbon Dioxide Level   30 mmol/L  


 


Anion Gap   6.0 mmol/L  


 


Blood Urea Nitrogen   13 mg/dl  


 


Creatinine   1.03 mg/dl  


 


Est Creatinine Clear Calc


Drug Dose 


  


  59.6 ml/min 


  


 


 


Estimated GFR (


American) 


  


  84.5 


  


 


 


Estimated GFR (Non-


American 


  


  72.9 


  


 


 


BUN/Creatinine Ratio   12.7  


 


Random Glucose   378 mg/dl  


 


Calcium Level   8.7 mg/dl  


 


Total Bilirubin   0.5 mg/dl  


 


Direct Bilirubin    mg/dl  


 


Aspartate Amino Transf


(AST/SGOT) 


  


  11 U/L 


  


 


 


Alanine Aminotransferase


(ALT/SGPT) 


  


  24 U/L 


  


 


 


Alkaline Phosphatase   91 U/L  


 


Total Protein   7.7 gm/dl  


 


Albumin   3.8 gm/dl  


 


Globulin   3.9 gm/dl  


 


Albumin/Globulin Ratio   1.0  


 


Thyroid Stimulating Hormone


(TSH) 


  


  4.360 uIu/ml 


  


 


 


Chemistry Specimen Hemolysis     


 


Ethyl Alcohol mg/dL   < 3.0 mg/dl  


 


Urine Color    YELLOW 


 


Urine Appearance    CLEAR 


 


Urine pH    5.0 


 


Urine Specific Gravity    > 1.045 


 


Urine Protein    NEG 


 


Urine Glucose (UA)    3+ 


 


Urine Ketones    NEG 


 


Urine Occult Blood    NEG 


 


Urine Nitrite    NEG 


 


Urine Bilirubin    NEG 


 


Urine Urobilinogen    NEG 


 


Urine Leukocyte Esterase    NEG 


 


Urine Pregnancy Test    NEG 


 


Urine Opiates Screen    NEG 


 


Urine Methadone, Qualitative    NEG 


 


Urine Barbiturates    NEG 


 


Urine Phencyclidine (PCP)


Level 


  


  


  NEG 


 


 


Ur


Amphetamine/Methamphetamine 


  


  


  NEG 


 


 


MDMA (Ecstasy) Screen    NEG 


 


Urine Benzodiazepines Screen    NEG 


 


Urine Cocaine Metabolite    NEG 


 


Urine Marijuana (THC)    NEG 


 


Test


  1/31/18


15:57 1/31/18


18:17 1/31/18


20:26 1/31/18


20:51


 


Bedside Glucose 267 mg/dl  224 mg/dl  50 mg/dl  86 mg/dl 


 


Test


  1/31/18


22:08 2/1/18


00:07 2/1/18


04:21 2/1/18


07:43


 


Bedside Glucose 201 mg/dl  165 mg/dl  153 mg/dl  193 mg/dl 











Mental Examination


During interview pt is:  alert and oriented, cooperative


Appearance:  disheveled (unkempt hair, t-shirt inside out)


Eye contact is:  fair


Motor behavior is:  steady gait & station, no abnormal motor movements


Speech:  other (Normal in rate and rhythm, soft, hesitant)


Affect:  mood congruent, depressed, tearful, anxious


Mood is:  depressed, anxious


Thought process:  goal directed, clear, coherent


Thought content:  reality based without delusions


Suicidal thought are:  present ("suicide sounds appealing"), Plan: denied, 

Intent: denied


Homicidal thoughts are:  denied


Hallucinations:  denies auditory, denies visual


Cognition:  memory grossly intact, attention grossly intact, language grossly 

intact


Intelligence estimated to be:  average


Insight:  limited


Judgement:  limited





Impression / Recommendations


Impression


32-year-old  female presenting with worsening depression and SI.  Pt 

notes depression has worsened with recent stressor including the end of a long-

term relationship, her grandmother's death, and her recent job loss.  Pt denies 

current plan or intent and states she typically seeks help before it reaches 

that level of severity.  Pt has a history of suicide attempt by overdose on 

insulin in the past.  Currently tolerating Zoloft 100mg daily for the past 9 

months.  Also has done well with Lexapro 10mg for 1 year, with and without 

Abilify adjunct.





Inventory Assets


Strengths:


willingness for treatment, family supports


Needs:


mitigate risk factors, assistance dealing with recent stressors.





Risk Factors Assessment


:  Yes


/single/:  Yes


Access to guns:  No


Health problems:  Yes


Mental Health Diagnoses:  Yes


Previous attempt:  Yes


Previous psychiatric stay:  Yes


Hopelessness:  Yes


Smoker:  No (recently quit)





Protective Factors Assessment


Episcopalian beliefs:  Yes


:  No


Responsible for young children:  No


Employed:  No


Stable relationships:  No


Supportive family:  Yes





Recommendations





(1) Major depressive disorder, recurrent episode


2/1


   - 


   - Encourage participation in group programming including recreation therapy 

and group therapy.


   - Coordination with previous outpatient providers


   - Involvement of identified supports in family meeting





(2) Suicidal ideation


2/1


   - Suicide safety checks q15 minutes


   - Encourage engagement in group programming with attention to appropriate 

coping skills.





(3) Type 1 diabetes mellitus


2/1


   - Diabetic pharmacy consult and management due to diagnosis and inconsistent 

random blood glucose readings on the unit.





(4) Hypothyroidism


2/1


   - TSH checked prior to admission in ED.  Level as of 1/31 = 4.360.


   - Pt reports regular follow up with endocrinology





(5) Anxiety disorder NOS


2/1


   - Reports panic attacks 2-3 times a month along with anxiety with social 

interactions.


   - See depression treatment above.








CPT Code


Initial Hospital Care:  07789





Problem Qualifiers





(1) Major depressive disorder, recurrent episode:  


Major depression episode severity:  severe  Psychotic features:  without 

psychotic features  Qualified Codes:  F33.2 - Major depressive disorder, 

recurrent severe without psychotic features

## 2018-02-02 VITALS — SYSTOLIC BLOOD PRESSURE: 123 MMHG | DIASTOLIC BLOOD PRESSURE: 73 MMHG | HEART RATE: 88 BPM | TEMPERATURE: 98.24 F

## 2018-02-02 RX ADMIN — INSULIN ASPART SCH UNITS: 100 INJECTION, SOLUTION INTRAVENOUS; SUBCUTANEOUS at 13:20

## 2018-02-02 RX ADMIN — INSULIN ASPART SCH UNITS: 100 INJECTION, SOLUTION INTRAVENOUS; SUBCUTANEOUS at 17:29

## 2018-02-02 RX ADMIN — INSULIN ASPART SCH UNITS: 100 INJECTION, SOLUTION INTRAVENOUS; SUBCUTANEOUS at 20:54

## 2018-02-02 RX ADMIN — SERTRALINE HYDROCHLORIDE SCH MG: 100 TABLET, FILM COATED ORAL at 08:43

## 2018-02-02 RX ADMIN — INSULIN ASPART SCH UNITS: 100 INJECTION, SOLUTION INTRAVENOUS; SUBCUTANEOUS at 09:03

## 2018-02-02 RX ADMIN — LEVOTHYROXINE SODIUM SCH MCG: 100 TABLET ORAL at 07:49

## 2018-02-02 RX ADMIN — INSULIN GLARGINE SCH UNITS: 100 INJECTION, SOLUTION SUBCUTANEOUS at 09:04

## 2018-02-02 NOTE — PSYCHIATRIC PROGRESS NOTES
Progress Note


Date of Service


Feb 2, 2018.





Interval History


Yoanna Velazquez is a 32-year-old female admitted on Jan 31, 2018 at 17:01 who 

currently lives in [place] with 3 roommates.  Yoanna Velazquez was admitted on 

a 201 voluntary commitment.  Patient is admitted from home.  The patient was 

brought to the ED by self transport.  Information provided by the patient is 

considered to be reliable.





Chief Complaint


"I'm pretty alright I'd say".





Subjective


Patient was seen & assessed interval progress reviewed with Treatment Team.  

Staff reports morning glucose checks of 372 and 389.  Pt has been scheduled for 

medication management and therapy through [facility].  Pt was seen today to 

assess progress since admission.  She reports feeling "pretty alright" and 

states she has not noticed a change in mood since admission.  Sleep continues 

to be adequate; however, she reports ongoing decrease in appetite.  Pt reports 

"they tell me I have to eat at least 50%, so that's what I do".  She reports 

nausea with food consumption for the past month and states she is unsure of the 

cause.  Pt is aware of the need to eat, but has difficulty due to the nausea 

and lack of appetite.  Pt continues to endorse SI, most recently for a period 

of time last evening.  Pt received increased dose of Zoloft this AM and has not 

noticed any side effects or concerns thus far.





Review of Systems


Psych:  denies symptoms other than stated above


Constitutional: denied


Cardiovascular: denied


GI: reports nausea


Neurologic: denied


Remainder of 10 body systems also reviewed and denied other than noted above.





Sleep Information


Total Hours of Sleep:  7.25





Meal Information


Percent of Breakfast Consumed:  25


Percent of Lunch Consumed:  50


Percent of Dinner Consumed:  50





Mental Status Exam


During interview pt is:  alert and oriented, cooperative


Appearance:  disheveled (t-shirt inside out; remains unkempt)


Eye contact is:  good


Motor behavior is:  steady gait & station, no abnormal motor movements


Speech:  other (Normal in rate and rhythm, soft voice, minimal in responses)


Affect:  mood congruent, depressed, anxious


Mood is:  depressed ("pretty alright"), anxious


Thought process:  goal directed, clear, coherent


Thought content:  reality based without delusions


Suicidal thought are:  present (most recently last evening), Plan: denied, 

Intent: denied


Homicidal thoughts are:  denied


Hallucinations:  denies auditory, denies visual


Cognition:  memory grossly intact, attention grossly intact, language grossly 

intact


Intelligence estimated to be:  average


Insight:  limited


Judgement:  limited





Medication Trials


Lexapro - 1 year duration, worked well


Abilify - adjunct to Lexapro for a time, worked well


Celexa - increased SI


Wellbutrin


Klonopin





Impression


Pt unable to report improvement in mood at this time.  Increased Zoloft to 

150mg effective this morning.  Pt reports ongoing SI, most recently last 

evening.  Blood glucose remains unstable and patient reports physical side 

effects of nausea from her high readings this morning.  Continue to monitor 

mood and consider increase to 200mg on Zoloft if necessary.  Pt requires 

ongoing inpatient treatment as she continues to endorse SI while on the unit 

and is at risk of self-harm if discharged.





Plan





(1) Major depressive disorder, recurrent episode


2/1


   - Increase sertraline to 150mg daily.


   - Encourage participation in group programming including recreation therapy 

and group therapy.


   - Coordination with previous outpatient providers.


   - Involvement of identified supports in family meeting.





2/2


   - Received initial dose of Zoloft 150mg this AM


   - Continue to encourage participation in group programming


   - Encourage involvement of supports in family session





(2) Suicidal ideation


2/1


   - Suicide safety checks q15 minutes


   - Encourage engagement in group programming with attention to appropriate 

coping skills.





(3) Anxiety disorder NOS


2/1


   - Reports panic attacks 2-3 times a month along with anxiety with social 

interactions.


   - See depression treatment above.


   - Hydroxyzine prn.





(4) Type 1 diabetes mellitus


2/1


   - Diabetic pharmacy consult and management due to diagnosis and inconsistent 

random blood glucose readings on the unit.





2/2 


   - Variability of glucose control ranging from 50 to 485 over the course of 

her admission, pt has 2 consecutive readings in the 300s this AM


   - Report from glycemic pharmacist reviewed.  Recommends tightening CF and CR 

parameters.  Added overnight BSG check at 0200.  





(5) Hypothyroidism


2/1


   - TSH checked prior to admission in ED.  Level as of 1/31 = 4.360.


   - Pt reports regular follow up with endocrinology








Discharge / Aftercare Planning


Primary Care Physician:  


   Name:  Sammy Salgado  - Dr. Ellis - will refer to Endocronologist


   Phone Number:  [phone]


   Date of Appointment:  Feb 8, 2018


   Time of Appointment:  10:25 am


   Appointment Notes:  [address] [facility] [address]


Psychiatrist:  


   Name:  Joseaarti Rodriguez [facility]


   Phone Number:  [phone]


   Date of Appointment:  Feb 12, 2018


   Time of Appointment:  2:30 pm


   Appointment Notes:  [address] [facility] [address] [facility] [address]


Therapist:  


   Name:  Kieran Briana Lazo


   Phone Number:  [phone]


   Date of Appointment:  Feb 12, 2018


   Time of Appointment:  2:30 pm


   Appointment Notes:  [address] [address]





Visit Code


E&M Code:  89742





Inventory Assets


Strengths:


willingness for treatment, family supports


Needs:


mitigate risk factors, assistance dealing with recent stressors.





Risk Factors Assessment


:  Yes


/single/:  Yes


Higher / Fall in social status:  No


Health problems:  Yes


Mental Health Diagnoses:  Yes


Previous attempt:  Yes


Family history of suicide:  No


Previous psychiatric stay:  Yes


Hopelessness:  Yes


Smoker:  No (recently quit)





Protective Factors Assessment


Jew beliefs:  Yes


:  No


Responsible for young children:  No


Employed:  No


Stable relationships:  No


Supportive family:  Yes


Good rapport with provider:  No





Data


Vital Signs Last 24 Hrs:











  Date Time  Temp Pulse Resp B/P (MAP) Pulse Ox O2 Delivery O2 Flow Rate FiO2


 


2/2/18 06:36 36.8 73 16 123/73    





  88  120/85    








Meds Administered Last 24 Hrs:





Meds Administered (Past 24Hrs)








 Medications


  (Trade)  Dose


 Ordered  Sig/Denisse


 Route  Start Time


 Stop Time Status Last Admin


Dose Admin


 


 Sodium Chloride  1,000 ml @ 


 999 mls/hr  Q1H1M STAT


 IV  1/31/18 14:20


 1/31/18 15:20 DC 1/31/18 14:44


999 MLS/HR


 


 Sertraline HCl


  (Zoloft Tab)  100 mg  QAM


 PO  2/1/18 09:00


 2/1/18 10:24 DC 2/1/18 08:39


100 MG


 


 Levothyroxine


 Sodium


  (Synthroid Tab)  100 mcg  [id number]@0800


 PO  2/1/18 08:00


 3/3/18 07:59  2/2/18 07:49


100 MCG


 


 Insulin Aspart


  (novoLOG ASPART)  10 units  NOW  ONCE


 SC  1/31/18 18:00


 1/31/18 18:01 DC 1/31/18 18:21


10 UNITS


 


 Influenza Virus


 Vaccine Quadrival


  (Flucelvax Quad


 Vaccine)  0.5 ml  ONCE ONCE


 IM.  1/31/18 20:00


 1/31/18 20:01 DC 2/1/18 13:28


0.5 ML


 


 Insulin Aspart


  (novoLOG ASPART)  SLIDING


 SCALE  ACHS


 SC  1/31/18 22:00


 3/2/18 21:59  2/2/18 09:03


20 UNITS


 


 Insulin Aspart


  (novoLOG ASPART)  SLIDING


 SCALE  TODAY@0000,0400


 SC  2/1/18 00:00


 2/1/18 04:01 DC 2/1/18 00:22


1 UNITS


 


 Insulin Glargine


  (Lantus Solostar


 Pen)  8 units  NOW  ONCE


 SC  1/31/18 22:30


 1/31/18 22:31 DC 1/31/18 22:45


8 UNITS


 


 Insulin Glargine


  (Lantus Solostar


 Pen)  SEE


 PROTOCOL  DAILY


 SC  2/1/18 09:00


 3/3/18 08:59  2/2/18 09:04


18 UNITS


 


 Sertraline HCl


  (Zoloft Tab)  150 mg  QAM


 PO  2/2/18 09:00


 3/3/18 08:59  2/2/18 08:43


150 MG








Lab Results Last 24 Hrs:





Last 24 Hours








Test


  2/1/18


12:06 2/1/18


17:13 2/1/18


21:40 2/2/18


07:43


 


Bedside Glucose 368 mg/dl  190 mg/dl  110 mg/dl  372 mg/dl 











Problem Qualifiers





(1) Major depressive disorder, recurrent episode:  


Major depression episode severity:  severe  Psychotic features:  without 

psychotic features  Qualified Codes:  F33.2 - Major depressive disorder, 

recurrent severe without psychotic features

## 2018-02-02 NOTE — PHARMACY PROGRESS NOTE
Glycemic Control Progress Note


Date of Service


Feb 2, 2018.





Scope


Glycemic Pharmacist consulted for glycemic control to write orders per [facility] 

inpatient glycemic control protocol.





Objective


Accuchecks BSG (last 24hrs):











Test


  2/1/18


12:06 2/1/18


17:13 2/1/18


21:40 2/2/18


07:43


 


Bedside Glucose


  368 mg/dl


(70-90) 190 mg/dl


(70-90) 110 mg/dl


(70-90) 372 mg/dl


(70-90)











Recent Pertinent Medications


Outpatient Anti-diabetic Regimen: 


* Novolin R 10 units BID (or Lantus 15 units SQ AM when pt can afford it) with 

Sliding scale Regular insulin


* A1c = 7.2 %  9/29/17








The patient is currently receiving:


* Basal insulin:             LANTUS SQ daily


 * 0 units for BSG < 80mg/dl


 * 10 units for BSG 80-180mg/dl


 * 14 units for BSG > 180mg/dl  - pt received 14 units yesterday





* Correctional Insulin:     Novolog Correction per scale ACHS


                            Goal Range: Low 120 mg/dL - High 160 mg/dL


                            Correction Factor: 20 mg/dL/unit





* Prandial insulin:           Per carb ratio of 1 unit per 7 grams CHO consumed








Risk Factors for Insulin Resistance:


* Diet: Type 2 DM





Assessment & Plan


ASSESSMENT:


2/2/18


* Pt had one episode of hyperglycemia yesterday and CF and CR parameters were 

tightened, and brought patient back to goal, but then her BSG erich overnight to 

389mg/dl this morning, pt slept all night, no carbs consumed. Will try 

increasing Lantus this morning and tighten CR slightly to home dose.


* Will also add overnight BSG check at 0200 - spoke to charge RN, she is OK 

with doing this for patient, she easily sleeps and goes back to sleep.





2/1/18


* 32 year old female type 1 diabetic, well controlled per A1c in September. 

Needs updated A1c, but pt is currently refusing the lab


* Admitted to [facility] for suicidal ideation


* Admitted with hyperglycemia, then had an episode of hypoglycemia last night, 

treated with OJ and snack


* Fasting AM BSG 193mg/dl this morning, will begin Lantus based on BSG once 

daily in the morning and continue CF and CR and titrate to goal





PLAN FOR INPATIENT GLYCEMIC CONTROL:





* Basal insulin with LANTUS SQ daily - INCREASE


 * 0 units for BSG < 80mg/dl


 * 15 units for BSG 80-180mg/dl


 * 18 units for BSG > 180mg/dl





* Correctional Insulin with NOVOLOG per scale ACHS or Q6hr while NPO & at 0200 

overnight tonight


 * Goal Range:  Low 120 mg/dL - High 160 mg/dL


 * Correction Factor: 20 mg/dL/unit


 * TIGHTEN: Nutritional / Prandial insulin per carb ratio of 1 unit per 5 grams 

CHO consumed





* Please note that the plan above was derived based on current level of insulin 

resistance and hospital stress. These recommendations are appropriate for 

inpatient admission only. Plan of care upon discharge will need to be 

reassessed to avoid potential outpatient hypo/hyperglycemia. 





Thank you.

## 2018-02-03 VITALS — HEART RATE: 86 BPM | TEMPERATURE: 98.6 F | SYSTOLIC BLOOD PRESSURE: 123 MMHG | DIASTOLIC BLOOD PRESSURE: 76 MMHG

## 2018-02-03 RX ADMIN — LEVOTHYROXINE SODIUM SCH MCG: 100 TABLET ORAL at 07:54

## 2018-02-03 RX ADMIN — INSULIN ASPART SCH UNITS: 100 INJECTION, SOLUTION INTRAVENOUS; SUBCUTANEOUS at 21:18

## 2018-02-03 RX ADMIN — SERTRALINE HYDROCHLORIDE SCH MG: 100 TABLET, FILM COATED ORAL at 09:25

## 2018-02-03 RX ADMIN — INSULIN ASPART SCH UNITS: 100 INJECTION, SOLUTION INTRAVENOUS; SUBCUTANEOUS at 08:00

## 2018-02-03 RX ADMIN — INSULIN ASPART SCH UNITS: 100 INJECTION, SOLUTION INTRAVENOUS; SUBCUTANEOUS at 17:40

## 2018-02-03 RX ADMIN — INSULIN ASPART SCH UNITS: 100 INJECTION, SOLUTION INTRAVENOUS; SUBCUTANEOUS at 13:33

## 2018-02-03 NOTE — PSYCHIATRIC PROGRESS NOTES
Progress Note


Date of Service


Feb 3, 2018.





Interval History


Yoanna Velazquez is a 32-year-old female admitted on Jan 31, 2018 at 17:01 who 

currently lives in [place] with 3 roommates.  Yoanna Velazquez was admitted on 

a 201 voluntary commitment.  Patient is admitted from home.  The patient was 

brought to the ED by self transport.  Information provided by the patient is 

considered to be reliable.





Chief Complaint


"fine".





Subjective


Patient was seen & assessed interval progress reviewed with Nursing.  Staff 

reports patient had a phone meeting with her mother yesterday which focused 

quite a bit on the mother's concerns about the patient's low weight.  Pt's 

blood glucose readings remain elevated in the 300's and patient continues to 

receive close monitoring from the glycemic pharmacist.  Staff reports the 

patient's mother suggested a facility in [place] for long-term depression 

treatment and states she would be willing to pay for the service.  Pt was seen 

today to assess progress since admission.  Pt reports feeling more optimistic 

and less anxious.  She states her mood has improved.  Pt reports plans to have 

her established with a  to help manage insurance and appointment 

issues after the loss of her job.  Pt reports meeting with her mother last 

evening was "ok" and that she feels her mother is "just being a concerned mother

".  Pt reports considerable discussion about the patient's weight to which 

patient continues to endorse low appetite due to stress and nausea with 

ingestion of food.  She denies poor self-image and recognizes the need to eat.  

Pt reports working with the dietician and has been receiving easily-tolerable 

foods, she now reports decreased nausea.  Pt denies SI at this encounter with 

her last episode being two evenings ago.  She denies concerns or needs today.





Review of Systems


Psych:  denies symptoms other than stated above


Constitutional: denied


Cardiovascular: denied


GI: denied


Neurologic: denied


Remainder of 10 body systems also reviewed and denied other than noted above.





Sleep Information


Total Hours of Sleep:  7.50





Meal Information


Percent of Breakfast Consumed:  90


Percent of Lunch Consumed:  90


Percent of Dinner Consumed:  75





Mental Status Exam


During interview pt is:  alert and oriented, cooperative


Appearance:  appropriately dressed (new clothing), appropriately groomed


Eye contact is:  good


Motor behavior is:  steady gait & station, no abnormal motor movements


Speech:  normal in rate, rhythm & volume (minimal responses)


Affect:  mood congruent, blunted, anxious


Mood is:  other ("fine")


Thought process:  goal directed, clear, coherent


Thought content:  reality based without delusions


Suicidal thought are:  denied, Plan: denied, Intent: denied


Homicidal thoughts are:  denied


Hallucinations:  denies auditory, denies visual


Cognition:  memory grossly intact, attention grossly intact, language grossly 

intact


Intelligence estimated to be:  average


Insight:  fair


Judgement:  fair





Medication Trials


Lexapro - 1 year duration, worked well


Abilify - adjunct to Lexapro for a time, worked well


Celexa - increased SI


Wellbutrin


Klonopin





Impression


Pt noticing improvement in mood and denies SI for the past two days.  She 

reports feeling more optimistic.  Nausea with food intake decreased following 

consult with dietician.  Continue to monitor mood, discussed increase to 200mg 

on Zoloft if necessary down the road.  Pt requires ongoing inpatient treatment 

as she continues to endorse SI while on the unit and is at risk of self-harm if 

discharged.





Plan





(1) Major depressive disorder, recurrent episode


2/1


   - Increase sertraline to 150mg daily.


   - Encourage participation in group programming including recreation therapy 

and group therapy.


   - Coordination with previous outpatient providers.


   - Involvement of identified supports in family meeting.





2/2


   - Received initial dose of Zoloft 150mg this AM


   - Continue to encourage participation in group programming


   - Encourage involvement of supports in family session





2/3 


   - Continue current medications


   - Encourage participation in group programming. 





(2) Suicidal ideation


2/1


   - Suicide safety checks q15 minutes


   - Encourage engagement in group programming with attention to appropriate 

coping skills.





(3) Anxiety disorder NOS


2/1


   - Reports panic attacks 2-3 times a month along with anxiety with social 

interactions.


   - See depression treatment above.


   - Hydroxyzine prn.





(4) Type 1 diabetes mellitus


2/1


   - Diabetic pharmacy consult and management due to diagnosis and inconsistent 

random blood glucose readings on the unit.





2/2 


   - Variability of glucose control ranging from 50 to 485 over the course of 

her admission, pt has 2 consecutive readings in the 300s this AM


   - Report from glycemic pharmacist reviewed.  Recommends tightening CF and CR 

parameters.  Added overnight BSG check at 0200.  





2/3 


   - Ongoing monitoring and recommendations from glycemic pharmacist.  





(5) Hypothyroidism


2/1


   - TSH checked prior to admission in ED.  Level as of 1/31 = 4.360.


   - Pt reports regular follow up with endocrinology








Discharge / Aftercare Planning


Primary Care Physician:  


   Name:  Sammy Salgado  - Dr. Ellis - will refer to Endocronologist


   Phone Number:  [phone]


   Date of Appointment:  Feb 8, 2018


   Time of Appointment:  10:25 am


   Appointment Notes:  [address] [facility] [address]


Psychiatrist:  


   Name:  Dai Warren - TelePsychiatry


   Phone Number:  [phone]


   Date of Appointment:  Feb 12, 2018


   Time of Appointment:  2:30 pm


   Appointment Notes:  [address] [address]


Therapist:  


   Name:  Dai Lazo


   Phone Number:  [phone]


   Date of Appointment:  Feb 12, 2018


   Time of Appointment:  2:30 pm


   Appointment Notes:  [address] [place] [address]





Visit Code


E&M Code:  87761





Inventory Assets


Strengths:


willingness for treatment, family supports


Needs:


mitigate risk factors, assistance dealing with recent stressors.





Risk Factors Assessment


:  Yes


/single/:  Yes


Higher / Fall in social status:  No


Health problems:  Yes


Mental Health Diagnoses:  Yes


Previous attempt:  Yes


Family history of suicide:  No


Previous psychiatric stay:  Yes


Hopelessness:  Yes


Smoker:  No (recently quit)





Protective Factors Assessment


Religion beliefs:  Yes


:  No


Responsible for young children:  No


Employed:  No


Stable relationships:  No


Supportive family:  Yes


Good rapport with provider:  No





Data


Vital Signs Last 24 Hrs:











  Date Time  Temp Pulse Resp B/P (MAP) Pulse Ox O2 Delivery O2 Flow Rate FiO2


 


2/3/18 06:55 37.0 76 16 125/76    





  86  123/85    








Meds Administered Last 24 Hrs:





Meds Administered (Past 24Hrs)








 Medications


  (Trade)  Dose


 Ordered  Sig/Denisse


 Route  Start Time


 Stop Time Status Last Admin


Dose Admin


 


 Sertraline HCl


  (Zoloft Tab)  150 mg  QAM


 PO  2/2/18 09:00


 3/3/18 08:59  2/3/18 09:25


150 MG


 


 Insulin Glargine


  (Lantus Solostar


 Pen)  8 units  TODAY@0815  ONCE


 SC  2/3/18 08:15


 2/3/18 08:16 DC 2/3/18 09:27


8 UNITS








Lab Results Last 24 Hrs:





Last 24 Hours








Test


  2/2/18


15:58 2/2/18


20:43 2/3/18


01:41 2/3/18


07:57


 


Bedside Glucose 75 mg/dl  123 mg/dl  70 mg/dl  73 mg/dl 


 


Test


  2/3/18


12:18 


  


  


 


 


Bedside Glucose 255 mg/dl    











Problem Qualifiers





(1) Major depressive disorder, recurrent episode:  


Major depression episode severity:  severe  Psychotic features:  without 

psychotic features  Qualified Codes:  F33.2 - Major depressive disorder, 

recurrent severe without psychotic features

## 2018-02-03 NOTE — PHARMACY PROGRESS NOTE
Glycemic Control Progress Note


Date of Service


Feb 3, 2018.





Scope


Glycemic Pharmacist consulted for glycemic control to write orders per [facility] 

inpatient glycemic control protocol.





Objective


Accuchecks BSG (last 24hrs):











Test


  2/2/18


15:58 2/2/18


20:43 2/3/18


01:41 2/3/18


07:57


 


Bedside Glucose


  75 mg/dl


(70-90) 123 mg/dl


(70-90) 70 mg/dl


(70-90) 73 mg/dl


(70-90)


 


Test


  2/3/18


12:18 


  


  


 


 


Bedside Glucose


  255 mg/dl


(70-90) 


  


  


 











Recent Pertinent Medications


Outpatient Anti-diabetic Regimen: 


* Novolin R 10 units BID (or Lantus 15 units SQ AM when pt can afford it) with 

Sliding scale Regular insulin


* A1c = 7.2 %  9/29/17








The patient is currently receiving:


* Basal insulin:             LANTUS SQ daily


 * 0 units for BSG < 80mg/dl


 * 15 units for BSG 80-180mg/dl


 * 18 units for BSG > 180mg/dl  - pt received 18 units yesterday





* Correctional Insulin:     Novolog Correction per scale ACHS


                            Goal Range: Low 120 mg/dL - High 160 mg/dL


                            Correction Factor: 20 mg/dL/unit





* Prandial insulin:           Per carb ratio of 1 unit per 5 grams CHO consumed








Risk Factors for Insulin Resistance:


* Diet: Type 2 DM





Assessment & Plan


ASSESSMENT:


2/3/18


* BSG was low at 70mg/dl overnight at 0200, treated with OJ, and still at 73mg/

dl when she woke up, so I reduced dose of AM Lantus, and decrease overall daily 

dose. 


2/2/18


* Pt had one episode of hyperglycemia yesterday and CF and CR parameters were 

tightened, and brought patient back to goal, but then her BSG erich overnight to 

389mg/dl this morning, pt slept all night, no carbs consumed. Will try 

increasing Lantus this morning and tighten CR slightly to home dose.


* Will also add overnight BSG check at 0200 - spoke to charge RN, she is OK 

with doing this for patient, she easily sleeps and goes back to sleep.





2/1/18


* 32 year old female type 1 diabetic, well controlled per A1c in September. 

Needs updated A1c, but pt is currently refusing the lab


* Admitted to [facility] for suicidal ideation


* Admitted with hyperglycemia, then had an episode of hypoglycemia last night, 

treated with OJ and snack


* Fasting AM BSG 193mg/dl this morning, will begin Lantus based on BSG once 

daily in the morning and continue CF and CR and titrate to goal





PLAN FOR INPATIENT GLYCEMIC CONTROL:





* Basal insulin with LANTUS SQ daily - DECREASE


 * 8 units SQ this AM then


 * 0 units for BSG < 80mg/dl


 * 12 units for BSG 80-180mg/dl


 * 15 units for BSG > 180mg/dl





* Correctional Insulin with NOVOLOG per scale ACHS or Q6hr while NPO 


 * Goal Range:  Low 120 mg/dL - High 160 mg/dL


 * Correction Factor: 20 mg/dL/unit


 * Nutritional / Prandial insulin per carb ratio of 1 unit per 5 grams CHO 

consumed





* Please note that the plan above was derived based on current level of insulin 

resistance and hospital stress. These recommendations are appropriate for 

inpatient admission only. Plan of care upon discharge will need to be 

reassessed to avoid potential outpatient hypo/hyperglycemia. 





Thank you.

## 2018-02-04 VITALS — TEMPERATURE: 98.6 F | SYSTOLIC BLOOD PRESSURE: 122 MMHG | DIASTOLIC BLOOD PRESSURE: 74 MMHG | HEART RATE: 93 BPM

## 2018-02-04 RX ADMIN — SERTRALINE HYDROCHLORIDE SCH MG: 100 TABLET, FILM COATED ORAL at 08:28

## 2018-02-04 RX ADMIN — INSULIN ASPART SCH UNITS: 100 INJECTION, SOLUTION INTRAVENOUS; SUBCUTANEOUS at 13:03

## 2018-02-04 RX ADMIN — INSULIN ASPART SCH UNITS: 100 INJECTION, SOLUTION INTRAVENOUS; SUBCUTANEOUS at 08:25

## 2018-02-04 RX ADMIN — INSULIN GLARGINE SCH UNITS: 100 INJECTION, SOLUTION SUBCUTANEOUS at 08:24

## 2018-02-04 RX ADMIN — INSULIN ASPART SCH UNITS: 100 INJECTION, SOLUTION INTRAVENOUS; SUBCUTANEOUS at 20:53

## 2018-02-04 RX ADMIN — INSULIN ASPART SCH UNITS: 100 INJECTION, SOLUTION INTRAVENOUS; SUBCUTANEOUS at 17:19

## 2018-02-04 NOTE — PSYCHIATRIC PROGRESS NOTES
Progress Note


Date of Service


Feb 4, 2018.





Interval History


Yoanna Velazquez is a 32-year-old female admitted on Jan 31, 2018 at 17:01 who 

currently lives in [place] with 3 roommates.  Yoanna Velazquez was admitted on 

a 201 voluntary commitment.  Patient is admitted from home.  The patient was 

brought to the ED by self transport.  Information provided by the patient is 

considered to be reliable.





Chief Complaint


"Better".





Subjective


Patient was seen & assessed interval progress reviewed with  Nursing. Staff 

report her blood glucose continues to be poorly controlled, with both 

hypoglycemic and hyperglycemic episodes. She goes to some groups and has 

limited participation.  Today the patient was seen in her room, she was in bed 

and feeling ill with a glucose of 398 this morning.  She has nausea and a 

headache, and has not been able to eat so far today.  She stated that her 

diabetes is chronically poorly controlled, but despite this, her mood has 

actually improved here.  She is feeling more hopeful, stating that she is 

feeling closer to discharge, and is thinking about the things she needs to do 

when she gets home, like looking for a job.  She says her insurance then 

someone to talk to her about getting a , which she is willing to do

, although she wasn't sure what agency this would be through.  She denies 

suicidal thoughts, and feels safe here.





Sleep Information


Total Hours of Sleep:  9.00





Meal Information


Percent of Breakfast Consumed:  90


Percent of Lunch Consumed:  100


Percent of Dinner Consumed:  100





Mental Status Exam


During interview pt is:  alert and oriented, cooperative


Appearance:  disheveled, other (very thin, lying in bed in no acute distress)


Eye contact is:  good


Motor behavior is:  no abnormal motor movements


Speech:  normal in rate, rhythm & volume


Affect:  blunted, anxious


Mood is:  other ("better")


Thought process:  goal directed


Thought content:  reality based without delusions


Suicidal thought are:  denied, Plan: denied, Intent: denied


Homicidal thoughts are:  denied


Hallucinations:  denies auditory, denies visual


Cognition:  memory grossly intact, attention grossly intact, language grossly 

intact


Intelligence estimated to be:  average


Insight:  fair


Judgement:  fair





Medication Trials


Lexapro - 1 year duration, worked well


Abilify - adjunct to Lexapro for a time, worked well


Celexa - increased SI


Wellbutrin


Klonopin





Impression


Mood and suicidal thoughts are improving, the patient is feeling more optimistic

, and is making plans for the future.  She continues to have significant 

difficulty with her diabetes, today with the elevated blood sugar, nausea, and 

headache.  She is hoping to be able to be discharged in the next couple of days

, and has been referred for outpatient treatment at [facility].  

Although she is improving, her progress as an early tentative stages, and she 

requires continued inpatient treatment at this time due to the risk of 

decompensation and harm to self if discharged prematurely.





Plan





(1) Major depressive disorder, recurrent episode


2/1


   - Increase sertraline to 150mg daily.


   - Encourage participation in group programming including recreation therapy 

and group therapy.


   - Coordination with previous outpatient providers.


   - Involvement of identified supports in family meeting.





2/2


   - Received initial dose of Zoloft 150mg this AM


   - Continue to encourage participation in group programming


   - Encourage involvement of supports in family session





2/3 


   - Continue current medications


   - Encourage participation in group programming. 





2/4


   - Continue current medications, and work on discharge safety plan.  She has 

been spending most of her time in bed, and has not been very engaged in groups 

or therapy.





(2) Suicidal ideation


2/1


   - Suicide safety checks q15 minutes


   - Encourage engagement in group programming with attention to appropriate 

coping skills.





(3) Anxiety disorder NOS


2/1


   - Reports panic attacks 2-3 times a month along with anxiety with social 

interactions.


   - See depression treatment above.


   - Hydroxyzine prn.





(4) Type 1 diabetes mellitus


2/1


   - Diabetic pharmacy consult and management due to diagnosis and inconsistent 

random blood glucose readings on the unit.





2/2 


   - Variability of glucose control ranging from 50 to 485 over the course of 

her admission, pt has 2 consecutive readings in the 300s this AM


   - Report from glycemic pharmacist reviewed.  Recommends tightening CF and CR 

parameters.  Added overnight BSG check at 0200.  





2/3 


   - Ongoing monitoring and recommendations from glycemic pharmacist.  





(5) Hypothyroidism


2/1


   - TSH checked prior to admission in ED.  Level as of 1/31 = 4.360.


   - Pt reports regular follow up with endocrinology








Discharge / Aftercare Planning


Primary Care Physician:  


   Name:  Sammy Salgado  - Dr. Ellis - will refer to Endocronologist


   Phone Number:  [phone]


   Date of Appointment:  Feb 8, 2018


   Time of Appointment:  10:25 am


   Appointment Notes:  [address] [facility] [address]


Psychiatrist:  


   Name:  Dai Briana [facility]


   Phone Number:  [phone]


   Date of Appointment:  Feb 12, 2018


   Time of Appointment:  2:30 pm


   Appointment Notes:  [address] [address]


Therapist:  


   Name:  Dai Briana Lazo


   Phone Number:  [phone]


   Date of Appointment:  Feb 12, 2018


   Time of Appointment:  2:30 pm


   Appointment Notes:  [address] [address]





Visit Code


E&M Code:  69672





Inventory Assets


Strengths:


willingness for treatment, family supports


Needs:


mitigate risk factors, assistance dealing with recent stressors.





Risk Factors Assessment


:  Yes


/single/:  Yes


Higher / Fall in social status:  No


Health problems:  Yes


Mental Health Diagnoses:  Yes


Previous attempt:  Yes


Family history of suicide:  No


Previous psychiatric stay:  Yes


Hopelessness:  Yes


Smoker:  No (recently quit)





Protective Factors Assessment


Tenriism beliefs:  Yes


:  No


Responsible for young children:  No


Employed:  No


Stable relationships:  No


Supportive family:  Yes


Good rapport with provider:  No





Data


Vital Signs Last 24 Hrs:











  Date Time  Temp Pulse Resp B/P (MAP) Pulse Ox O2 Delivery O2 Flow Rate FiO2


 


2/4/18 06:54 37.0 79 16 129/87    





  93  122/74    








Meds Administered Last 24 Hrs:





Meds Administered (Past 24Hrs)








 Medications


  (Trade)  Dose


 Ordered  Sig/Denisse


 Route  Start Time


 Stop Time Status Last Admin


Dose Admin


 


 Sertraline HCl


  (Zoloft Tab)  150 mg  QAM


 PO  2/2/18 09:00


 3/3/18 08:59  2/3/18 09:25


150 MG


 


 Insulin Glargine


  (Lantus Solostar


 Pen)  8 units  TODAY@0815  ONCE


 SC  2/3/18 08:15


 2/3/18 08:16 DC 2/3/18 09:27


8 UNITS








Lab Results Last 24 Hrs:





Last 24 Hours








Test


  2/3/18


07:57 2/3/18


12:18 2/3/18


16:04 2/3/18


16:23


 


Bedside Glucose 73 mg/dl  255 mg/dl  55 mg/dl  93 mg/dl 


 


Test


  2/3/18


21:15 


  


  


 


 


Bedside Glucose 146 mg/dl    











Problem Qualifiers





(1) Major depressive disorder, recurrent episode:  


Major depression episode severity:  severe  Psychotic features:  without 

psychotic features  Qualified Codes:  F33.2 - Major depressive disorder, 

recurrent severe without psychotic features

## 2018-02-04 NOTE — PHARMACY PROGRESS NOTE
Glycemic Control Progress Note


Date of Service


Feb 4, 2018.





Scope


Glycemic Pharmacist consulted for glycemic control to write orders per [facility] 

inpatient glycemic control protocol.





Objective


Accuchecks BSG (last 24hrs):











Test


  2/3/18


16:04 2/3/18


16:23 2/3/18


21:15 2/4/18


07:40


 


Bedside Glucose


  55 mg/dl


(70-90) 93 mg/dl


(70-90) 146 mg/dl


(70-90) 398 mg/dl


(70-90)


 


Test


  2/4/18


11:34 2/4/18


12:38 


  


 


 


Bedside Glucose


  179 mg/dl


(70-90) 123 mg/dl


(70-90) 


  


 











Recent Pertinent Medications


Outpatient Anti-diabetic Regimen: 


* Novolin R 10 units BID (or Lantus 15 units SQ AM when pt can afford it) with 

Sliding scale Regular insulin: CF 20mg/dl/unit; Carb ratio 1 unit per 5 grams 

CHO


* A1c = 7.2 %  9/29/17, pt refuses updated A1c








The patient is currently receiving:


* Basal insulin:             LANTUS SQ daily


 * 0 units for BSG < 80mg/dl


 * 12 units for BSG 80-180mg/dl


 * 15 units for BSG > 180mg/dl  


 *  pt received 8 units yesterday for hypoglycemia





* Correctional Insulin:     Novolog Correction per scale ACHS


                            Goal Range: Low 120 mg/dL - High 160 mg/dL


                            Correction Factor: 30 mg/dL/unit





* Prandial insulin:           Per carb ratio of 1 unit per 6 grams CHO consumed








Risk Factors for Insulin Resistance:


* Diet: Type 2 DM





Assessment & Plan


ASSESSMENT:


2/4/18


* Blood sugar erich 250 points overnight without any insulin or carb 

consumption. 


* Patient's Blood sugar has been very difficult to manage, and this is typical 

for her outpatient control too. 


* Patient will follow-up with endocrinologist after discharge.


* Spoke with nursing, pt will be OK with having blood sugars checked overnight 

to ensure patient is not rebounding from hypoglycemia overnight (possible 

somoygi effect).


* Pt wants to leave, likely discharge tomorrow.


* Spoke with Dr Francis today regarding patient's BSGs and plan for discharge.


2/3/18


* BSG was low at 70mg/dl overnight at 0200, treated with OJ, and still at 73mg/

dl when she woke up, so I reduced dose of AM Lantus, and decrease overall daily 

dose. 


2/2/18


* Pt had one episode of hyperglycemia yesterday and CF and CR parameters were 

tightened, and brought patient back to goal, but then her BSG erich overnight to 

389mg/dl this morning, pt slept all night, no carbs consumed. Will try 

increasing Lantus this morning and tighten CR slightly to home dose.


* Will also add overnight BSG check at 0200 - spoke to charge RN, she is OK 

with doing this for patient, she easily sleeps and goes back to sleep.





2/1/18


* 32 year old female type 1 diabetic, well controlled per A1c in September. 

Needs updated A1c, but pt is currently refusing the lab


* Admitted to [facility] for suicidal ideation


* Admitted with hyperglycemia, then had an episode of hypoglycemia last night, 

treated with OJ and snack


* Fasting AM BSG 193mg/dl this morning, will begin Lantus based on BSG once 

daily in the morning and continue CF and CR and titrate to goal





PLAN FOR INPATIENT GLYCEMIC CONTROL:





* Basal insulin with LANTUS SQ daily 


 * 0 units for BSG < 80mg/dl


 * 12 units for BSG 80-180mg/dl


 * 15 units for BSG > 180mg/dl





* Correctional Insulin with NOVOLOG per scale ACHS or Q6hr while NPO and 

overnight at 0000, 0400 tonight


 * Goal Range:  Low 120 mg/dL - High 160 mg/dL


 * Correction Factor: 25 mg/dL/unit


 * Nutritional / Prandial insulin per carb ratio of 1 unit per 6 grams CHO 

consumed





* Correctional Insulin with NOVOLOG per scale overnight at 0000, 0400 tonight


 * Goal Range:  Low 100 mg/dL - High 200 mg/dL


 * Correction Factor: 50 mg/dL/unit


 * Nutritional / Prandial insulin per carb ratio of 1 unit per 50 grams CHO 

consumed





* Please note that the plan above was derived based on current level of insulin 

resistance and hospital stress. These recommendations are appropriate for 

inpatient admission only. Plan of care upon discharge will need to be 

reassessed to avoid potential outpatient hypo/hyperglycemia. 





DISCHARGE RECOMMENDATIONS:


* Basaglar & Apidra covered by insurance 


* Basaglar 15 units SQ daily


* Apidra: 1 unit per 5 grams CHO consumed; 25mg/dl/unit for BSG > 175mg/dl








Thank you.

## 2018-02-05 VITALS — SYSTOLIC BLOOD PRESSURE: 127 MMHG | DIASTOLIC BLOOD PRESSURE: 82 MMHG | TEMPERATURE: 98.42 F | HEART RATE: 84 BPM

## 2018-02-05 RX ADMIN — SERTRALINE HYDROCHLORIDE SCH MG: 100 TABLET, FILM COATED ORAL at 09:15

## 2018-02-05 RX ADMIN — LEVOTHYROXINE SODIUM SCH MCG: 100 TABLET ORAL at 08:29

## 2018-02-05 RX ADMIN — INSULIN GLARGINE SCH UNITS: 100 INJECTION, SOLUTION SUBCUTANEOUS at 09:11

## 2018-02-05 RX ADMIN — INSULIN ASPART SCH UNITS: 100 INJECTION, SOLUTION INTRAVENOUS; SUBCUTANEOUS at 09:07

## 2018-02-05 RX ADMIN — INSULIN ASPART SCH UNITS: 100 INJECTION, SOLUTION INTRAVENOUS; SUBCUTANEOUS at 00:00

## 2018-02-05 RX ADMIN — INSULIN ASPART SCH UNITS: 100 INJECTION, SOLUTION INTRAVENOUS; SUBCUTANEOUS at 04:00

## 2018-02-05 NOTE — DISCHARGE INSTRUCTIONS
Discharge Information


Report Includes


Report will include the:  Discharge Instructions & Summary





Admission


Admission Date / Time:  Jan 31, 2018 at 17:01


Reason for Admission:  Major Depression Recurring Severe





Discharge


Discharge Diagnosis / Problem:  depression


Condition at Discharge:  Good





Discharge Goals


Goal(s):  Decrease discomfort, Improve disease control, Prevent Disease 

Progression





Activity Recommendations


Activity Limitations:  resume your previous activity





.





Instructions / Follow-Up


Instructions / Follow-Up


.





SPECIAL CARE INSTRUCTIONS:





1.  Follow through with your scheduled aftercare appointments.  If unable to 

keep an appointment, please call to reschedule.





2.  Take your medication only as prescribed.  Medication should not be changed 

or stopped without the approval of your doctor.  In the event of worsening 

symptoms or concerns about side effects, contact your doctor immediately.





3.  Utilize new healthy coping skills, anger management skills, and stress 

management skills learned during your hospitalization.  Journal feelings and 

process them with a support person.  Identify stressors or situations that may 

result in relapse, deterioration or inappropriate behaviors and develop a plan 

to deal with those issues.





4.  If your coping skills are ineffective and you are in crisis, contact your 

outpatient providers for direction.  If unable to reach your providers, please 

call the  [facility] HELP LINE AT [phone] or go to the closest Emergency Room.





5.  Avoid alcohol and un-prescribed drugs.





6.  You have been provided with the Mental Health Advance Directives Pamphlet 

for your review.








AFTERCARE APPOINTMENTS: 





*  Please call your insurance company prior to your scheduled appointment to 

confirm your aftercare providers are covered.  Take your insurance information 

to your appointments.





.





Discharge / Aftercare Planning


Primary Care Physician:  


   Name:  Sammy Salgado  - Dr. Ellis - will refer to Endocronologist


   Phone Number:  [phone]


   Date of Appointment:  Feb 8, 2018


   Time of Appointment:  10:25 am


   Appointment Notes:  [address] [place] [address] [place] [address]


Psychiatrist:  


   Name:  Dai Warren - TelePsychiatry


   Phone Number:  [phone]


   Date of Appointment:  Feb 12, 2018


   Time of Appointment:  2:30 pm


   Appointment Notes:  [address] [place] [address]


Therapist:  


   Name Of Therapist:  Dai Lazo


   Phone Number:  [phone]


   Date of Appointment:  Feb 12, 2018


   Time of Appointment:  2:30 pm


   Appointment Comments:  [address] [place] 

[address]





.





Follow-Up Care


Plan for Follow-Up Care:


The patient will have follow up at [facility].





Current Hospital Diet


Patient's current hospital diet: Diabetes Type 1 Diet





Discharge Diet


Recommended Diet:  Diabetes Type 1 Diet





Procedures


Procedures Performed:  No





Pending Studies


Pending Studies at Discharge:  No





Medical Emergencies








.


Who to Call and When:





Medical Emergencies:  


For questions or emergencies related to your hospital stay, please contact the 

Inpatient Behavioral Health Unit at [phone].





A psychiatric clinician is on-call 24/7 for the Behavioral Health Unit for 

emergencies





At any time you feel your situation is an emergency, you may also call 911 

immediately.





.





Non-Emergent Contact


Non-Emergency issues call your:  Primary Care Provider, Psychiatrist, Therapist





Past History


Medical & Surgical History:  


(1) Type 1 diabetes mellitus


(2) Hypothyroidism





Advance Directives


Existing Advance Directive:  No


Do You Have an Existing Mental:  No


Existing Living Will:  No


Existing Power of :  No


Advance Directives Info Given:  To Pt/S.O.


Advance Directives Reason:


Declines as Mental Health Visit.





Discharge Summary


Admission HPI


Per the Admitting provider:


Yoanna Velazquez is a 32-year-old  female admitted to [address] [place] [address] on a 

voluntary commitment following worsening depression with SI for the past 2 

months.  Pt notes several recent stressors including her grandmother's death in 

September 2017 and her boyfriend of 3.5 years leaving her in November 2017.  Pt 

states she started to feel worsening of depression in mid-December which 

worsened when she was fired from her job 1.5 weeks ago due to not showing up 

for work due to decreased mood.  Pt states she has struggled with depression "

on and off for my entire adult life".  She has had several inpatient 

hospitalizations in the past for depression and SI at [facility], [facility], 

[facility], and [facility].  Her most recent admission was at [facility] in 

2014 following depression and a suicide attempt in which she attempted to 

overdose on her insulin.  She was been taking Zoloft 100mg regularly for the 

past 9 months, currently prescribed by her endocrinologist as her mood was 

concerning.  Pt is a Type-1 diabetic and also has been diagnosed with 

hypothyroidism.  She reports recent weight loss due to nausea and abdominal 

pain with ingestion of food.  





Pt reports depressive symptoms including low mood, isolative behavior, 

decreased functioning in regard to attending to hygiene, reduced appetite 

resulting in >20lb weight loss in the last 2 months, sleep difficulties with 

frequent awakening, increased desire to remain in bed, decreased concentration, 

hopelessness, and guilt.  Pt states she has had SI for the past few months, and 

states "suicide is appealing, but I haven't thought about an actual plan".  Pt 

states her concern for her family has prevented her from acting on these 

thoughts.  Pt also reports ongoing anxiety specifically in relation to social 

interactions.  She reports feeling uncomfortable with situations outside of her 

home or group of roommates.  She reports 2-3 panic attacks in the last months 

in which "it feels like death is imminent and I worry about what that will mean 

for me and my family".  





Pt denies current outpatient psychiatric treatment, but was seen a few years 

ago at [facility] for both medication management and therapy.  Pt 

reports trials of Celexa, Wellbutrin, Klonopin, Abilify, and Lexapro.  Pt feels 

the Lexapro has worked the best and recalls taking 10mg daily for about 1 year.

  At some point Abilify was added to the regimen which she reports was also 

beneficial to her mood.  Pt denies HI, SIB aside from unintentionally picking 

skin, joel, paranoia, OCD, PTSD, eating disorder, and other psychosis.





Hospital Course





(1) Major depressive disorder, recurrent episode


2/1


   - Increase sertraline to 150mg daily.


   - Encourage participation in group programming including recreation therapy 

and group therapy.


   - Coordination with previous outpatient providers.


   - Involvement of identified supports in family meeting.





2/2


   - Received initial dose of Zoloft 150mg this AM


   - Continue to encourage participation in group programming


   - Encourage involvement of supports in family session





2/3 


   - Continue current medications


   - Encourage participation in group programming. 





2/4


   - Continue current medications, and work on discharge safety plan.  She has 

been spending most of her time in bed, and has not been very engaged in groups 

or therapy.





(2) Suicidal ideation


2/1


   - Suicide safety checks q15 minutes


   - Encourage engagement in group programming with attention to appropriate 

coping skills.





(3) Anxiety disorder NOS


2/1


   - Reports panic attacks 2-3 times a month along with anxiety with social 

interactions.


   - See depression treatment above.


   - Hydroxyzine prn.





(4) Type 1 diabetes mellitus


2/1


   - Diabetic pharmacy consult and management due to diagnosis and inconsistent 

random blood glucose readings on the unit.





2/2 


   - Variability of glucose control ranging from 50 to 485 over the course of 

her admission, pt has 2 consecutive readings in the 300s this AM


   - Report from glycemic pharmacist reviewed.  Recommends tightening CF and CR 

parameters.  Added overnight BSG check at 0200.  





2/3 


   - Ongoing monitoring and recommendations from glycemic pharmacist.  





(5) Hypothyroidism


2/1


   - TSH checked prior to admission in ED.  Level as of 1/31 = 4.360.


   - Pt reports regular follow up with endocrinology








Risk Factors Assessment


:  Yes


/single/:  Yes


Higher / Fall in social status:  No


Health problems:  Yes


Mental Health Diagnoses:  Yes


Previous attempt:  Yes


Family history of suicide:  No


Previous psychiatric stay:  Yes


Hopelessness:  Yes


Smoker:  No (recently quit)





Protective Factors Assessment


Orthodox beliefs:  Yes


:  No


Responsible for young children:  No


Employed:  No


Stable relationships:  No


Supportive family:  Yes


Good rapport with provider:  No





Day of Discharge Assessment


COURSE OF HOSPITALIZATION: The patient was on our unit for 5 days.  She was 

admitted voluntarily with multiple stressors including the recent death of her 

grandmother, breakup with a boyfriend, and loss of her job.  She also has type 

1 diabetes that has been unstable.  During her stay her Zoloft was increased to 

150 mg daily.  We enlist the support of the diabetic pharmacist to help to 

adjust her insulins dosage in view of the fact that she was having both high 

and low readings.  The patient follows with Alina HAWKINS for 

endocrinology.  During her stay, the patient participated in group and 

individual counseling although when her sugars were irregular, she sometimes 

felt unwell and not able to go to groups.  Family meeting was held with the 

patient's mother on February 2.  Mother observed the patient's mood had been 

worsening since the breakup with the boyfriend.  Mother was also worried that 

she had problems with her body image and eating which the patient admitted 

seeing that sometimes eating makes her sick and she finds food to be offensive.

  She also noted patient's mood consistently worsens during winter months.  

Over the course of her stay her mood improved, she rates her mood today an 8 or 

9 out of 10.  She is agreeable to having follow-up with [facility] and denies 

any further thoughts of suicide.  She denies any side effects to Zoloft.





DAY OF DISCHARGE ASSESSMENT: Today the patient is requesting discharge.  She 

feels ready to go home, and is without suicidal thinking.  She is able to state 

a safety plan that includes reaching out to her providers, her roommates and 

ultimately returning to the emergency department in the event of severe 

worsening mood problems.  Today she is casually dressed.  She is somewhat 

disheveled.  She is extremely thin and does not appear healthy.  Her gait and 

station are within normal limits.  Eye contact is minimal.  Speech is of normal 

rate volume and tone.  Thoughts are organized, goal-directed, and without 

evidence of thought disorder.  Recent and remote memory are intact per 

conversation.  Intelligence is estimated to be average.  Insight and judgment 

are improved over admission.





Laboratory











Test


  1/31/18


14:23 1/31/18


14:35 2/5/18


03:59 2/5/18


07:30


 


White Blood Count 4.65    


 


Red Blood Count 4.68    


 


Hemoglobin 16.6    


 


Hematocrit 46.4    


 


Mean Corpuscular Volume 99.1    


 


Mean Corpuscular Hemoglobin 35.5    


 


Mean Corpuscular Hemoglobin


Concent 35.8 


  


  


  


 


 


Platelet Count 208    


 


Mean Platelet Volume 11.6    


 


Neutrophils (%) (Auto) 53.8    


 


Lymphocytes (%) (Auto) 33.3    


 


Monocytes (%) (Auto) 8.6    


 


Eosinophils (%) (Auto) 3.7    


 


Basophils (%) (Auto) 0.2    


 


Neutrophils # (Auto) 2.50    


 


Lymphocytes # (Auto) 1.55    


 


Monocytes # (Auto) 0.40    


 


Eosinophils # (Auto) 0.17    


 


Basophils # (Auto) 0.01    


 


RDW Standard Deviation 45.8    


 


RDW Coefficient of Variation 12.7    


 


Immature Granulocyte % (Auto) 0.4    


 


Immature Granulocyte # (Auto) 0.02    


 


Sodium Level 132    


 


Potassium Level 3.9    


 


Chloride Level 96    


 


Carbon Dioxide Level 30    


 


Anion Gap 6.0    


 


Blood Urea Nitrogen 13    


 


Creatinine 1.03    


 


Est Creatinine Clear Calc


Drug Dose 59.6 


  


  


  


 


 


Estimated GFR (


American) 84.5 


  


  


  


 


 


Estimated GFR (Non-


American 72.9 


  


  


  


 


 


BUN/Creatinine Ratio 12.7    


 


Random Glucose 378    


 


Calcium Level 8.7    


 


Total Bilirubin 0.5    


 


Direct Bilirubin     


 


Aspartate Amino Transferase


(AST) 11 


  


  


  


 


 


Alanine Aminotransferase (ALT) 24    


 


Alkaline Phosphatase 91    


 


Total Protein 7.7    


 


Albumin 3.8    


 


Globulin 3.9    


 


Albumin/Globulin Ratio 1.0    


 


Thyroid Stimulating Hormone


(TSH) 4.360 


  


  


  


 


 


Chemistry Specimen Hemolysis     


 


Ethyl Alcohol mg/dL < 3.0    


 


Urine Color  YELLOW   


 


Urine Appearance  CLEAR   


 


Urine pH  5.0   


 


Urine Specific Gravity  > 1.045   


 


Urine Protein  NEG   


 


Urine Glucose (UA)  3+   


 


Urine Ketones  NEG   


 


Urine Occult Blood  NEG   


 


Urine Nitrite  NEG   


 


Urine Bilirubin  NEG   


 


Urine Urobilinogen  NEG   


 


Urine Leukocyte Esterase  NEG   


 


Urine Pregnancy Test  NEG   


 


Urine Opiates Screen  NEG   


 


Urine Methadone, Qualitative  NEG   


 


Urine Barbiturates  NEG   


 


Urine Phencyclidine (PCP)


Level 


  NEG 


  


  


 


 


Ur


Amphetamine/Methamphetamine 


  NEG 


  


  


 


 


MDMA (Ecstasy) Screen  NEG   


 


Urine Benzodiazepines Screen  NEG   


 


Urine Cocaine Metabolite  NEG   


 


Urine Marijuana (THC)  NEG   


 


POC Glucose   186  306 











Total Time


Total Time Spent (min):  Greater than 30 minutes


Total Time Included:  examination of the patient, discharge planning, 

medication reconciliation, communication with other providers





Tobacco Cessation at Discharge


Smoking Status:  Former Smoker


FDA approved Prescription:  non-smoker





Problem Qualifiers





(1) Major depressive disorder, recurrent episode:  


Major depression episode severity:  severe  Psychotic features:  without 

psychotic features  Qualified Codes:  F33.2 - Major depressive disorder, 

recurrent severe without psychotic features

## 2018-02-20 ENCOUNTER — HOSPITAL ENCOUNTER (EMERGENCY)
Dept: HOSPITAL 45 - C.EDB | Age: 33
Discharge: HOME | End: 2018-02-20
Payer: COMMERCIAL

## 2018-02-20 VITALS — OXYGEN SATURATION: 98 % | HEART RATE: 100 BPM | SYSTOLIC BLOOD PRESSURE: 128 MMHG | DIASTOLIC BLOOD PRESSURE: 85 MMHG

## 2018-02-20 VITALS
WEIGHT: 103.62 LBS | WEIGHT: 103.62 LBS | HEIGHT: 64.02 IN | BODY MASS INDEX: 17.69 KG/M2 | HEIGHT: 64.02 IN | BODY MASS INDEX: 17.69 KG/M2

## 2018-02-20 VITALS — TEMPERATURE: 97.52 F

## 2018-02-20 DIAGNOSIS — F32.9: ICD-10-CM

## 2018-02-20 DIAGNOSIS — S32.029A: Primary | ICD-10-CM

## 2018-02-20 DIAGNOSIS — E03.9: ICD-10-CM
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
## 2018-02-20 DIAGNOSIS — Z88.8: ICD-10-CM

## 2018-02-20 DIAGNOSIS — F41.9: ICD-10-CM

## 2018-02-20 DIAGNOSIS — E11.9: ICD-10-CM

## 2018-02-20 DIAGNOSIS — S32.039A: ICD-10-CM
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
## 2018-03-08 ENCOUNTER — HOSPITAL ENCOUNTER (EMERGENCY)
Dept: HOSPITAL 45 - C.EDB | Age: 33
Discharge: HOME | End: 2018-03-08
Payer: COMMERCIAL

## 2018-03-08 VITALS
SYSTOLIC BLOOD PRESSURE: 129 MMHG | DIASTOLIC BLOOD PRESSURE: 86 MMHG | OXYGEN SATURATION: 96 % | HEART RATE: 110 BPM | TEMPERATURE: 98.06 F

## 2018-03-08 VITALS
HEIGHT: 64.02 IN | BODY MASS INDEX: 18.37 KG/M2 | HEIGHT: 64.02 IN | WEIGHT: 107.59 LBS | BODY MASS INDEX: 18.37 KG/M2 | WEIGHT: 107.59 LBS

## 2018-03-08 DIAGNOSIS — S32.029D: ICD-10-CM

## 2018-03-08 DIAGNOSIS — Z79.4: ICD-10-CM

## 2018-03-08 DIAGNOSIS — E03.9: ICD-10-CM

## 2018-03-08 DIAGNOSIS — E10.9: ICD-10-CM

## 2018-03-08 DIAGNOSIS — F41.9: ICD-10-CM

## 2018-03-08 DIAGNOSIS — Z79.899: ICD-10-CM

## 2018-03-08 DIAGNOSIS — S32.039D: Primary | ICD-10-CM

## 2018-03-08 DIAGNOSIS — Z87.440: ICD-10-CM

## 2018-03-08 DIAGNOSIS — F32.9: ICD-10-CM

## 2018-03-08 DIAGNOSIS — F17.210: ICD-10-CM
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
## 2018-04-02 ENCOUNTER — HOSPITAL ENCOUNTER (EMERGENCY)
Dept: HOSPITAL 45 - C.EDB | Age: 33
Discharge: PSYCHIATRIC OR SUBSTANCE USE FACILITY | End: 2018-04-02
Payer: COMMERCIAL

## 2018-04-02 VITALS — SYSTOLIC BLOOD PRESSURE: 120 MMHG | HEART RATE: 95 BPM | OXYGEN SATURATION: 98 % | DIASTOLIC BLOOD PRESSURE: 77 MMHG

## 2018-04-02 VITALS
BODY MASS INDEX: 20.55 KG/M2 | HEIGHT: 64.02 IN | HEIGHT: 64.02 IN | WEIGHT: 120.37 LBS | BODY MASS INDEX: 20.55 KG/M2 | WEIGHT: 120.37 LBS

## 2018-04-02 VITALS — TEMPERATURE: 98.24 F

## 2018-04-02 DIAGNOSIS — R45.851: ICD-10-CM

## 2018-04-02 DIAGNOSIS — Z88.8: ICD-10-CM

## 2018-04-02 DIAGNOSIS — F41.9: ICD-10-CM

## 2018-04-02 DIAGNOSIS — E03.9: ICD-10-CM

## 2018-04-02 DIAGNOSIS — F17.200: ICD-10-CM

## 2018-04-02 DIAGNOSIS — Z79.4: ICD-10-CM

## 2018-04-02 DIAGNOSIS — Z82.49: ICD-10-CM

## 2018-04-02 DIAGNOSIS — E10.9: ICD-10-CM

## 2018-04-02 DIAGNOSIS — F39: Primary | ICD-10-CM

## 2018-04-02 DIAGNOSIS — F32.9: ICD-10-CM

## 2018-04-02 LAB
ALBUMIN SERPL-MCNC: 3.3 GM/DL (ref 3.4–5)
ALP SERPL-CCNC: 91 U/L (ref 45–117)
ALT SERPL-CCNC: 28 U/L (ref 12–78)
AST SERPL-CCNC: 11 U/L (ref 15–37)
BASOPHILS # BLD: 0.04 K/UL (ref 0–0.2)
BASOPHILS NFR BLD: 0.6 %
BUN SERPL-MCNC: 17 MG/DL (ref 7–18)
CALCIUM SERPL-MCNC: 9 MG/DL (ref 8.5–10.1)
CO2 SERPL-SCNC: 23 MMOL/L (ref 21–32)
CREAT SERPL-MCNC: 0.75 MG/DL (ref 0.6–1.2)
EOS ABS #: 0.07 K/UL (ref 0–0.5)
EOSINOPHIL NFR BLD AUTO: 258 K/UL (ref 130–400)
GLUCOSE SERPL-MCNC: 109 MG/DL (ref 70–99)
HCT VFR BLD CALC: 41.9 % (ref 37–47)
HGB BLD-MCNC: 14.6 G/DL (ref 12–16)
IG#: 0.05 K/UL (ref 0–0.02)
IMM GRANULOCYTES NFR BLD AUTO: 29.3 %
LYMPHOCYTES # BLD: 1.91 K/UL (ref 1.2–3.4)
MCH RBC QN AUTO: 34.7 PG (ref 25–34)
MCHC RBC AUTO-ENTMCNC: 34.8 G/DL (ref 32–36)
MCV RBC AUTO: 99.5 FL (ref 80–100)
MONO ABS #: 0.53 K/UL (ref 0.11–0.59)
MONOCYTES NFR BLD: 8.1 %
NEUT ABS #: 3.92 K/UL (ref 1.4–6.5)
NEUTROPHILS # BLD AUTO: 1.1 %
NEUTROPHILS NFR BLD AUTO: 60.1 %
PMV BLD AUTO: 11.2 FL (ref 7.4–10.4)
POTASSIUM SERPL-SCNC: 3.8 MMOL/L (ref 3.5–5.1)
PROT SERPL-MCNC: 6.7 GM/DL (ref 6.4–8.2)
RED CELL DISTRIBUTION WIDTH CV: 13 % (ref 11.5–14.5)
RED CELL DISTRIBUTION WIDTH SD: 47.2 FL (ref 36.4–46.3)
SODIUM SERPL-SCNC: 138 MMOL/L (ref 136–145)
WBC # BLD AUTO: 6.52 K/UL (ref 4.8–10.8)

## 2018-04-02 NOTE — EMERGENCY ROOM VISIT NOTE
ED Visit Note


First contact with patient:  18:02


I assumed care at the change of shift, Dr. Liu had been the physician just 

prior to me.





The patient had presented with suicidal ideation.  She had a plan to overdose 

on her insulin.  She was felt medically clear.  She was voluntary.  A bed 

search was performed and she has been accepted at [facility].  She has 

been cooperative during the time she has been under my care.  The paperwork for 

transfer was signed.

## 2018-04-02 NOTE — EMERGENCY ROOM VISIT NOTE
History


Report prepared by Elva:  Hannah Guardado


Under the Supervision of:  Dr. Julian Liu D.O.


First contact with patient:  15:02


Chief Complaint:  MENTAL HEALTH EVALUATION


Stated Complaint:  SUICIDAL, PANIC ATTACKS





History of Present Illness


The patient is a 32 year old female who presents to the Emergency Room for a 

mental health evaluation. The patient reports she has been feeling suicidal for 

the past couple weeks. The patient reports she has been researching different 

suicide plans. She reports she was planning on a insulin overdose. The patient 

is a type one diabetic. She states her mom made her come into the ED. The 

patient has a history of anxiety and depression. The patient's last menstrual 

period was a month ago. Pt denies headache, change in vision, fevers, chest pain

, shortness of breath, nausea, vomiting, diarrhea, pain with urination, and 

melena.





   Source of History:  patient


   Position:  other (generalized)


   Quality:  other (suicidal thoughts)


   Timing:  constant


   Modifying Factors (Relieving):  other (none)


   Associated Symptoms:  No fevers, No headache, No chest pain, No SOB, No 

nausea, No vomiting, No diarrhea, No urinary symptoms





Review of Systems


See HPI for pertinent positives & negatives. A total of 10 systems reviewed and 

were otherwise negative.





Past Medical & Surgical


Medical Problems:


(1) Anxiety disorder NOS


(2) DIAB JOHN WO COMPL, TYPE I [JUVENILE TYPE], NOT UNCNTRLD


(3) DIAB W KETOACIDOSIS, TYPE I [JUVENILE TYPE], UNCONTROLLED


(4) Hypothyroidism


(5) Major depressive disorder


(6) Major depressive disorder, recurrent episode


(7) Substance abuse


(8) Type 1 diabetes mellitus


(9) URIN TRACT INFECTION NOS








Family History





FH: cancer


Hypertension


Kidney disease or stones





Social History


Smoking Status:  Current Every Day Smoker


Alcohol Use:  none


Drug Use:  none


Marital Status:  in relationship


Housing Status:  lives with significant other


Occupation Status:  unemployed





Current/Historical Medications


Scheduled


Insulin Aspart (Novolog Flexpen), 1 UNITS SC ACHS


Insulin Glargine (Lantus Solostar), 15 UNITS SC QAM


Levothyroxine Sodium (Levothyroxine Sodium), 100 MCG PO 6XWK


Sertraline (Zoloft), 150 MG PO DAILY





Scheduled PRN


Hydrocodone/Acetaminophen 5MG/325MG (Norco 5MG/325MG), 1-2 TABLET PO Q6H PRN 

for Pain


Hydrocodone/Acetaminophen 5MG/325MG (Norco 5MG/325MG), 1-2 TABLET PO Q4H PRN 

for Pain


Hydroxyzine HCl (Hydroxyzine HCl), 25 MG PO Q8 PRN for Anxiety





Allergies


Coded Allergies:  


     Sulfamethoxazole w/Trimethoprim (Unverified  Allergy, Unknown, swelling, 4/ 2/18)


     Insulin Lispro (Verified  Adverse Reaction, Intermediate, MUSCLE WASTING, 4 /2/18)


     Phenol (Unverified  Adverse Reaction, Unknown, Muscle Wasting, 4/2/18)





Physical Exam


Vital Signs











  Date Time  Temp Pulse Resp B/P (MAP) Pulse Ox O2 Delivery O2 Flow Rate FiO2


 


4/2/18 16:45  98 18 105/81 97 Room Air  


 


4/2/18 15:00 36.8 101 18 121/81 97 Room Air  











Physical Exam


GENERAL: Sitting up in bed, alert, truly disheveled chronically ill appearing, 

well nourished, no distress, non-toxic 


EYE EXAM: normal conjunctiva. 


OROPHARYNX: no exudate, no erythema, lips, buccal mucosa, and tongue normal and 

mucous membranes are moist


NECK: supple, no nuchal rigidity, no adenopathy, non-tender


LUNGS: Clear to auscultation. Normal chest wall mechanics


HEART: no murmurs, S1 normal and S2 normal 


ABDOMEN: abdomen soft, non-tender, normo-active bowel sounds, no masses, no 

rebound or guarding. 


BACK: Back is symmetrical on inspection and there is no deformity, no midline 

tenderness, no CVA tenderness. 


SKIN: no rashes and no bruising 


UPPER EXTREMITIES: upper extremities are grossly normal. 


LOWER EXTREMITIES: No pitting edema.


NEURO EXAM: Normal sensorium


PSYCH: Admits to suicidal ideations with a plan to overdose on insulin.





Medical Decision & Procedures


Laboratory Results


4/2/18 15:44








Red Blood Count 4.21, Mean Corpuscular Volume 99.5, Mean Corpuscular Hemoglobin 

34.7, Mean Corpuscular Hemoglobin Concent 34.8, Mean Platelet Volume 11.2, 

Neutrophils (%) (Auto) 60.1, Lymphocytes (%) (Auto) 29.3, Monocytes (%) (Auto) 

8.1, Eosinophils (%) (Auto) 1.1, Basophils (%) (Auto) 0.6, Neutrophils # (Auto) 

3.92, Lymphocytes # (Auto) 1.91, Monocytes # (Auto) 0.53, Eosinophils # (Auto) 

0.07, Basophils # (Auto) 0.04





4/2/18 15:44

















Test


  4/2/18


15:20 4/2/18


15:44 4/2/18


17:05


 


Urine Color YELLOW   


 


Urine Appearance CLEAR (CLEAR)   


 


Urine pH 5.5 (4.5-7.5)   


 


Urine Specific Gravity


  1.043


(1.000-1.030) 


  


 


 


Urine Protein NEG (NEG)   


 


Urine Glucose (UA) 3+ (NEG)   


 


Urine Ketones TRACE (NEG)   


 


Urine Occult Blood NEG (NEG)   


 


Urine Nitrite NEG (NEG)   


 


Urine Bilirubin NEG (NEG)   


 


Urine Urobilinogen NEG (NEG)   


 


Urine Leukocyte Esterase NEG (NEG)   


 


Urine Pregnancy Test NEG (NEG)   


 


Urine Opiates Screen NEG (NEG)   


 


Urine Methadone, Qualitative NEG (NEG)   


 


Urine Barbiturates NEG (NEG)   


 


Urine Phencyclidine (PCP)


Level NEG (NEG) 


  


  


 


 


Ur


Amphetamine/Methamphetamine NEG (NEG) 


  


  


 


 


MDMA (Ecstasy) Screen NEG (NEG)   


 


Urine Benzodiazepines Screen NEG (NEG)   


 


Urine Cocaine Metabolite NEG (NEG)   


 


Urine Marijuana (THC) NEG (NEG)   


 


White Blood Count


  


  6.52 K/uL


(4.8-10.8) 


 


 


Red Blood Count


  


  4.21 M/uL


(4.2-5.4) 


 


 


Hemoglobin


  


  14.6 g/dL


(12.0-16.0) 


 


 


Hematocrit  41.9 % (37-47)  


 


Mean Corpuscular Volume


  


  99.5 fL


() 


 


 


Mean Corpuscular Hemoglobin


  


  34.7 pg


(25-34) 


 


 


Mean Corpuscular Hemoglobin


Concent 


  34.8 g/dl


(32-36) 


 


 


Platelet Count


  


  258 K/uL


(130-400) 


 


 


Mean Platelet Volume


  


  11.2 fL


(7.4-10.4) 


 


 


Neutrophils (%) (Auto)  60.1 %  


 


Lymphocytes (%) (Auto)  29.3 %  


 


Monocytes (%) (Auto)  8.1 %  


 


Eosinophils (%) (Auto)  1.1 %  


 


Basophils (%) (Auto)  0.6 %  


 


Neutrophils # (Auto)


  


  3.92 K/uL


(1.4-6.5) 


 


 


Lymphocytes # (Auto)


  


  1.91 K/uL


(1.2-3.4) 


 


 


Monocytes # (Auto)


  


  0.53 K/uL


(0.11-0.59) 


 


 


Eosinophils # (Auto)


  


  0.07 K/uL


(0-0.5) 


 


 


Basophils # (Auto)


  


  0.04 K/uL


(0-0.2) 


 


 


RDW Standard Deviation


  


  47.2 fL


(36.4-46.3) 


 


 


RDW Coefficient of Variation


  


  13.0 %


(11.5-14.5) 


 


 


Immature Granulocyte % (Auto)  0.8 %  


 


Immature Granulocyte # (Auto)


  


  0.05 K/uL


(0.00-0.02) 


 


 


Anion Gap


  


  10.0 mmol/L


(3-11) 


 


 


Est Creatinine Clear Calc


Drug Dose 


  94.5 ml/min 


  


 


 


Estimated GFR (


American) 


  124.0 


  


 


 


Estimated GFR (Non-


American 


  107.0 


  


 


 


BUN/Creatinine Ratio  22.0 (10-20)  


 


Calcium Level


  


  9.0 mg/dl


(8.5-10.1) 


 


 


Total Bilirubin


  


  0.2 mg/dl


(0.2-1) 


 


 


Direct Bilirubin


  


  < 0.1 mg/dl


(0-0.2) 


 


 


Aspartate Amino Transf


(AST/SGOT) 


  11 U/L (15-37) 


  


 


 


Alanine Aminotransferase


(ALT/SGPT) 


  28 U/L (12-78) 


  


 


 


Alkaline Phosphatase


  


  91 U/L


() 


 


 


Total Protein


  


  6.7 gm/dl


(6.4-8.2) 


 


 


Albumin


  


  3.3 gm/dl


(3.4-5.0) 


 


 


Thyroid Stimulating Hormone


(TSH) 


  2.010 uIu/ml


(0.300-4.500) 


 


 


Ethyl Alcohol mg/dL


  


  < 3.0 mg/dl


(0-3) 


 


 


Bedside Glucose


  


  


  102 mg/dl


(70-90)





Laboratory results per my review.





ED Course


ED COURSE: 


Vital signs were reviewed and showed normal


The patients medical record was reviewed


The above diagnostic studies were performed and reviewed.


ED treatments and interventions as stated above. 





1505: The patient was evaluated in room A8. A complete history and physical 

examination was performed.





1800: The patient was signed out to Dr. Agudelo at the change of shifts.





Medical Decision


Differential diagnosis:





Etiologies such as mood disorder, infection, hypoglycemia, electrolyte 

abnormalities, cardiac sources, intracerebral event, toxicologic, neurologic, 

as well as others were entertained.








Patient is a 32-year-old female who is a type I diabetic that presents to ER 

with depression and anxiety.  She admits to suicidal ideations with a clear 

plan to overdose on her insulin.  She has no physical complaints at this time.  

CBC along with BMP, LFTs, bilirubin and TSH were unremarkable.  Initial BSG was 

only slightly low in the 90s.  She was given some to eat and drink and is 

completely resolved.  Urine tox is negative.  Alcohol negative.  Pregnancy 

negative.  Patient remained stable in the ER.  She admits to having a clear 

plan to kill herself and acknowledges that she was doing research online on how 

to do this.  She is agreeable on a 201.  Bed search is currently being 

performed.  Patient was signed out to Dr. Agudelo awaiting placement.





Medication Reconcilliation


Current Medication List:  was personally reviewed by me





Blood Pressure Screening


Patient's blood pressure:  Normal blood pressure





Impression





 Primary Impression:  


 Mood disorder


 Additional Impression:  


 Suicidal ideation





Scribe Attestation


The scribe's documentation has been prepared under my direction and personally 

reviewed by me in its entirety. I confirm that the note above accurately 

reflects all work, treatment, procedures, and medical decision making performed 

by me.





Departure Information


Dispostion


Still a Patient





Referrals


No Doctor, Assigned (PCP)





Forms


HOME CARE DOCUMENTATION FORM,                                                 

               IMPORTANT VISIT INFORMATION





Patient Instructions


My [facility]





Problem Qualifiers

## 2018-04-02 NOTE — EMERGENCY ROOM VISIT NOTE
ED Visit Note


First contact with patient:  18:02


Just prior to the patient leaving for [facility], her blood sugar was 

noted to be over 400.  As per her sliding scale she would typically take 12 

units of regular subcu insulin.  As she is traveling over an hour, I felt that 

decreasing the dose slightly would be more reasonable.  She was given 10 units 

of regular insulin subcu.